# Patient Record
Sex: MALE | URBAN - METROPOLITAN AREA
[De-identification: names, ages, dates, MRNs, and addresses within clinical notes are randomized per-mention and may not be internally consistent; named-entity substitution may affect disease eponyms.]

---

## 2023-04-13 ENCOUNTER — APPOINTMENT (OUTPATIENT)
Dept: URBAN - METROPOLITAN AREA CLINIC 259 | Age: 73
Setting detail: DERMATOLOGY
End: 2023-04-13

## 2023-04-13 DIAGNOSIS — L82.1 OTHER SEBORRHEIC KERATOSIS: ICD-10-CM

## 2023-04-13 DIAGNOSIS — D22 MELANOCYTIC NEVI: ICD-10-CM

## 2023-04-13 DIAGNOSIS — D18.0 HEMANGIOMA: ICD-10-CM

## 2023-04-13 DIAGNOSIS — L81.4 OTHER MELANIN HYPERPIGMENTATION: ICD-10-CM

## 2023-04-13 DIAGNOSIS — L91.8 OTHER HYPERTROPHIC DISORDERS OF THE SKIN: ICD-10-CM

## 2023-04-13 DIAGNOSIS — L57.0 ACTINIC KERATOSIS: ICD-10-CM

## 2023-04-13 DIAGNOSIS — Z71.89 OTHER SPECIFIED COUNSELING: ICD-10-CM

## 2023-04-13 DIAGNOSIS — L57.8 OTHER SKIN CHANGES DUE TO CHRONIC EXPOSURE TO NONIONIZING RADIATION: ICD-10-CM

## 2023-04-13 PROBLEM — D18.01 HEMANGIOMA OF SKIN AND SUBCUTANEOUS TISSUE: Status: ACTIVE | Noted: 2023-04-13

## 2023-04-13 PROBLEM — D22.5 MELANOCYTIC NEVI OF TRUNK: Status: ACTIVE | Noted: 2023-04-13

## 2023-04-13 PROCEDURE — OTHER COUNSELING: OTHER

## 2023-04-13 PROCEDURE — 99203 OFFICE O/P NEW LOW 30 MIN: CPT | Mod: 25

## 2023-04-13 PROCEDURE — 17003 DESTRUCT PREMALG LES 2-14: CPT

## 2023-04-13 PROCEDURE — OTHER MIPS QUALITY: OTHER

## 2023-04-13 PROCEDURE — OTHER LIQUID NITROGEN: OTHER

## 2023-04-13 PROCEDURE — 17000 DESTRUCT PREMALG LESION: CPT

## 2023-04-13 ASSESSMENT — LOCATION SIMPLE DESCRIPTION DERM
LOCATION SIMPLE: RIGHT PRETIBIAL REGION
LOCATION SIMPLE: LEFT PRETIBIAL REGION
LOCATION SIMPLE: RIGHT UPPER BACK
LOCATION SIMPLE: POSTERIOR SCALP
LOCATION SIMPLE: LEFT UPPER BACK
LOCATION SIMPLE: LEFT AXILLARY VAULT

## 2023-04-13 ASSESSMENT — LOCATION DETAILED DESCRIPTION DERM
LOCATION DETAILED: LEFT PROXIMAL PRETIBIAL REGION
LOCATION DETAILED: POSTERIOR MID-PARIETAL SCALP
LOCATION DETAILED: LEFT AXILLARY VAULT
LOCATION DETAILED: LEFT MID-UPPER BACK
LOCATION DETAILED: RIGHT PROXIMAL PRETIBIAL REGION
LOCATION DETAILED: RIGHT SUPERIOR MEDIAL UPPER BACK
LOCATION DETAILED: LEFT MEDIAL UPPER BACK
LOCATION DETAILED: LEFT INFERIOR MEDIAL UPPER BACK

## 2023-04-13 ASSESSMENT — LOCATION ZONE DERM
LOCATION ZONE: SCALP
LOCATION ZONE: TRUNK
LOCATION ZONE: LEG
LOCATION ZONE: AXILLAE

## 2023-04-13 NOTE — PROCEDURE: LIQUID NITROGEN
Render Post-Care Instructions In Note?: no
Consent: The patient's consent was obtained including but not limited to risks of crusting, scabbing, blistering, scarring, darker or lighter pigmentary change, recurrence, incomplete removal and infection.
Post-Care Instructions: I reviewed with the patient in detail post-care instructions. Patient is to wear sunprotection, and avoid picking at any of the treated lesions. Pt may apply Vaseline to crusted or scabbing areas.
Duration Of Freeze Thaw-Cycle (Seconds): 0
Detail Level: Zone
Total Number Of Aks Treated: 5

## 2023-04-13 NOTE — PROCEDURE: MIPS QUALITY
Quality 431: Preventive Care And Screening: Unhealthy Alcohol Use - Screening: Patient not identified as an unhealthy alcohol user when screened for unhealthy alcohol use using a systematic screening method
Quality 226: Preventive Care And Screening: Tobacco Use: Screening And Cessation Intervention: Patient screened for tobacco use and is an ex/non-smoker
Quality 110: Preventive Care And Screening: Influenza Immunization: Influenza Immunization Ordered or Recommended, but not Administered due to system reason
Detail Level: Generalized
Quality 130: Documentation Of Current Medications In The Medical Record: Current Medications Documented

## 2023-05-05 ENCOUNTER — TRANSFERRED RECORDS (OUTPATIENT)
Dept: HEALTH INFORMATION MANAGEMENT | Facility: CLINIC | Age: 73
End: 2023-05-05

## 2023-06-09 ENCOUNTER — MEDICAL CORRESPONDENCE (OUTPATIENT)
Dept: HEALTH INFORMATION MANAGEMENT | Facility: CLINIC | Age: 73
End: 2023-06-09

## 2023-06-09 ENCOUNTER — TRANSFERRED RECORDS (OUTPATIENT)
Dept: HEALTH INFORMATION MANAGEMENT | Facility: CLINIC | Age: 73
End: 2023-06-09

## 2023-06-21 ENCOUNTER — REFERRAL (OUTPATIENT)
Dept: TRANSPLANT | Facility: CLINIC | Age: 73
End: 2023-06-21

## 2023-06-21 DIAGNOSIS — N18.4 CHRONIC KIDNEY DISEASE, STAGE IV (SEVERE) (H): ICD-10-CM

## 2023-06-21 DIAGNOSIS — N18.9 ANEMIA OF CHRONIC RENAL FAILURE: ICD-10-CM

## 2023-06-21 DIAGNOSIS — Z76.82 ORGAN TRANSPLANT CANDIDATE: ICD-10-CM

## 2023-06-21 DIAGNOSIS — I10 HYPERTENSION: ICD-10-CM

## 2023-06-21 DIAGNOSIS — N25.81 SECONDARY RENAL HYPERPARATHYROIDISM (H): ICD-10-CM

## 2023-06-21 DIAGNOSIS — D63.1 ANEMIA OF CHRONIC RENAL FAILURE: ICD-10-CM

## 2023-06-21 DIAGNOSIS — N18.4 CHRONIC KIDNEY DISEASE, STAGE 4, SEVERELY DECREASED GFR (H): Primary | ICD-10-CM

## 2023-06-21 DIAGNOSIS — E78.5 HYPERLIPIDEMIA: ICD-10-CM

## 2023-06-21 DIAGNOSIS — Z01.818 PRE-TRANSPLANT EVALUATION FOR KIDNEY TRANSPLANT: ICD-10-CM

## 2023-06-21 DIAGNOSIS — E11.9 DIABETES MELLITUS, TYPE 2 (H): ICD-10-CM

## 2023-06-21 NOTE — LETTER
Nael Silveira  [address] [facility] [address] [address]                August 18, 2023      Gabi Nayak,     I am sending this letter to review the pre-transplant process and education.     Please see your pre-kidney transplant evaluation on 08/21/203 starting at 7:30 AM. All your appointments will be at the:     [facility]  [address] [place] [id number]    For parking options, please park in the open lot across the street from the front door of our Clinic.  Otherwise, enter the Clinic and Surgery Center / arrival plaza from [facility] and attendants can assist you based on your needs.  parking is available for those with limited mobility M-F from 7:00 am to 5:00 pm.     Please bring your designated care person(s) to your appointment day to help listen to the patient education and ask questions that are important to you. You can eat/drink normally on this day. Please do not fast, as the appointment day will most likely go until 3 PM. There is a coffee shop on street level for you to purchase food and you can also bring food from home. Please be aware there are no microwaves or refrigerators for patient use at the clinic. Also, take all your prescribed medications as ordered on this day. There are no medication lab tests ordered during your appointments.    Upon completion of your appointments, I will compile the outcomes and have your results reviewed at the Transplant Team Selection Committee on Wednesday, 08/30/2023, of the following week. This is a medical review meeting only, you will not be asked to attend. I will call you within 10 days after this meeting to inform you of the outcomes and to assist in planning for the completion of your evaluation.     You have received an email today which contains a Receipt of Information consent and patient educational materials. You have already signed this consent today. Please read as much of these  educational materials as possible before your appointments on 08/21/2023.           Please complete your pre-kidney transplant education videos at these link: Pre-Kidney Transplant (I) (English): https://www.Litographsube.com/playlist?[email or web address]   Pre-Kidney Transplant (II) (English): https://www.Litographsube.com/playlist?[email or web address]     Additional transplant resources are as follows:   www.unos.org. UNOS, or United Network of Organ Sharing, is the national organization in our country that maintains all the organ wait lists and is responsible for the rules and regulations for organ allocation. I recommend looking at the Transplant Living section as this area has content created for patients.   www.srtr.org SRTR, or the Scientific Registry for Transplant Recipients is a national data base that all Transplant Centers report their success and failure rate for all organ types twice per year. The results are public knowledge and do provide a good perspective of organ transplant.     If the transplant providers tell you at your appointments that you should start to have live donors register with our Program to initiate their evaluations, please provide this registration website: [email or web address]   The donor will receive a detailed email response back with information and next steps specific to their situation. It is important that the donor responds to the email in order to  forward with their evaluation. Donors can also call our Office and ask to speak with a live donor coordinator in the event of questions at [phone].     Please let me know of any questions or concerns.     Sincerely,   Kaitlin Rust, RN, BSN  Pre Kidney Pancreas Transplant Coordinator   [facility]  Solid Organ Transplant Care   [address] [place] [address] [address]  [address] [address]  [email or web address]   Office Number:  [phone] Direct Number: [phone]   Fax Number: [phone]  Employed by [facility]     CC's: Dr. Jose E Ansari, Dr. Gagan Spear

## 2023-06-21 NOTE — LETTER
Nael Silveira  [address] [address]          Dear Nael,    Thank you for your interest in the Transplant Center at [facility]. We look forward to being a part of your care team and assisting you through the transplant process.    As we discussed, your transplant coordinator is Kaitlin Rust (Kidney).  You may call your coordinator at any time with questions or concerns.  Your first scheduled call will be on 7/11/2023, between 8am and 12pm.  If this needs to change, call [phone].    Please complete the following.    Fill out and return the enclosed forms  Authorization for Electronic Communication  Authorization to Discuss Protected Health Information  Authorization for Release of Protected Health Information    Sign up for:  afterBOTt, access to your electronic medical record (see enclosed pamphlet)  [employer]ord [place], a transplant education website    You can use these tools to learn more about your transplant, communicate with your care team, and track your medical details      Sincerely,      Solid Organ Transplant  [facility]    cc: Referring Physician

## 2023-06-27 ENCOUNTER — DOCUMENTATION ONLY (OUTPATIENT)
Dept: TRANSPLANT | Facility: CLINIC | Age: 73
End: 2023-06-27

## 2023-06-27 VITALS — BODY MASS INDEX: 35.55 KG/M2 | HEIGHT: 69 IN | WEIGHT: 240 LBS

## 2023-06-27 NOTE — TELEPHONE ENCOUNTER
PCP: Dr Gagan Spear MD  Referring Provider: Dr Jose E Ansari MD  Referring Diagnosis: CKD Stage 4    GFR/Date: 18 (4/28/2023)    Is patient under the age of 65? No  Is patient diabetic? Yes, per patient  Is patient on insulin? Yes, per patient  Was patient offered a pancreas transplant referral? No    Is patient in a group home/assisted living? No  Does patient have a guardian? No    Referral intake process completed.  Patient is aware that after financial approval is received, medical records will be requested.   Patient confirmed for a callback from transplant coordinator on 7/11/2023. (within 2 weeks)  Tentative evaluation date 8/21/2023, Slot 5 (within 4 weeks) if appointment is virtual, does patient have capabilities of setting this up? Patient request for in person appointment with transplant team.    Confirmed coordinator will discuss evaluation process in more detail at the time of their call.   Patient is aware of the need to arrange age appropriate cancer screening, vaccinations, and dental care.    Reminded patient to complete questionnaire, complete medical records release, and review packet prior to evaluation visit .    Assessed patient for special needs (ie-wheelchair, assistance, guardian, and ):  Yes  Patient wears glasses     Patient instructed to call [phone] with questions.     Patient gave verbal consent during intake call to obtain medical records and documents outside of [facility]:  Yes

## 2023-07-06 ENCOUNTER — TRANSFERRED RECORDS (OUTPATIENT)
Dept: HEALTH INFORMATION MANAGEMENT | Facility: CLINIC | Age: 73
End: 2023-07-06
Payer: MEDICARE

## 2023-07-11 ENCOUNTER — TELEPHONE (OUTPATIENT)
Dept: TRANSPLANT | Facility: CLINIC | Age: 73
End: 2023-07-11

## 2023-07-11 PROBLEM — E78.5 HYPERLIPIDEMIA: Status: ACTIVE | Noted: 2023-07-11

## 2023-07-11 PROBLEM — E11.9 DIABETES MELLITUS, TYPE 2 (H): Status: ACTIVE | Noted: 2023-07-11

## 2023-07-11 PROBLEM — I10 HYPERTENSION: Status: ACTIVE | Noted: 2023-07-11

## 2023-07-11 PROBLEM — N18.9 ANEMIA OF CHRONIC RENAL FAILURE: Status: ACTIVE | Noted: 2023-07-11

## 2023-07-11 PROBLEM — D63.1 ANEMIA OF CHRONIC RENAL FAILURE: Status: ACTIVE | Noted: 2023-07-11

## 2023-07-11 PROBLEM — N25.81 SECONDARY RENAL HYPERPARATHYROIDISM (H): Status: ACTIVE | Noted: 2023-07-11

## 2023-07-11 NOTE — TELEPHONE ENCOUNTER
Patient Call: General  Route to LPN    Reason for call: Tosin from [facility] called in regards patient's current appointment scheduled. They made a mistake with initail scheduling and need assistance fixing it. Call back number is [phone].    Call back needed? Yes    Return Call Needed  Same as documented in contacts section  When to return call?: Same day: Route High Priority

## 2023-07-11 NOTE — TELEPHONE ENCOUNTER
Writer called imaging back and rescheduled appointments.  No other action is needed.  Thanks!    Huma

## 2023-07-11 NOTE — TELEPHONE ENCOUNTER
Reviewed chart for purpose of pre kidney transplant evaluation planning. Per Dr. Jose E Ansari's note 05/05/2023, pt has CKD stage 4 secondary to diabetes mellitus. No kidney biopsy done.     Contacted patient and introduced myself as their Transplant Coordinator, also introduced the role of the Transplant Coordinator in the transplant process.  Explained the purpose of this call including reviewing next steps and answering questions.  Pt confirmed he wants to attend a pre kidney transplant evaluation.   Confirmed Referring Provider, Dialysis Center, and Primary Care Physician. Notified patient of the importance of continued communication with referring providers and primary care physicians.    Reviewed components of transplant evaluation process including necessary appointments, tests, and procedures.    Answered questions for patient regarding evaluation, provided my name and contact information and requested they call with any additional questions.    Determined that patient would like additional information regarding transplant by:     Drop Down choices: Mail, Email, MyChart, Phone Call   Encourage MyChart -  pt already has My Chart already.   Pt and wife confirmed they will attend STD PKE on 08/21/2023, slot # 5. Instructed pt to bring 1-2 people with them to eval and to eat and drink and normally on eval day. Instructed pt they will receive an email from Victorina in our Office prior to eval with a Receipt of Info and educational materials - instructed to read materials and sign consent prior to eval.  Instructed pt on use of My Transplant Place and to view pre-kidney eval parts 1 and 2, as well as, on the use of www.unos.org and www.srtr.org.  Pt and wife expressed excellent understanding of all and were in good agreement with the plan.    Smart set orders into EPIC for PKE 08/21/2023 slot # 5.   Pre transplant patient education letter/ email sent.

## 2023-07-16 ENCOUNTER — HEALTH MAINTENANCE LETTER (OUTPATIENT)
Age: 73
End: 2023-07-16

## 2023-08-18 NOTE — PROGRESS NOTES
[facility] Solid Organ Transplant  Outpatient MNT: Kidney Transplant Evaluation    Current BMI: 34.8 (HT 70 in,  lbs/110 kg)  BMI guideline for kidney transplant up to a BMI of 40 / per surgeon discretion     Frailty Assessment -- Not Frail (1/5 points)- low activity level       Time Spent: 30 minutes  Visit Type: Initial   Referring Physician: Emiliana   Pt accompanied by: his wife, Sharyn     History of previous txp: none   Dialysis: no     Nutrition Assessment  H/o DM II. Checks BG several times/day. Meet with diabetes provider who recommends 60 g cho/day. Wife reports often falling short of this at meals and having to find additional food just to give his insulin.     - Appetite: good/baseline   - Food allergies/intolerances: none   - Meal prep & grocery shopping: pt / wife   - Issues chewing or swallowing: none   - N/V/D/C: none   - Food access concerns: not asked     Vitamins, Supplements, Pertinent Meds: vit D  Herbal Medicines/Supplements: none     Edema: mild vs none     Weight hx: has gained 10 lbs, which pt somewhat correlates w/ having to eat more to give his insulin, being more sedentary over the years      Diet Recall  Breakfast Eggs, cereal, fruit, toast, oatmeal/cream of wheat    Lunch 1 slice bread with 2 slices cheese (grilled cheese) + can of Healthy Request tomato soup, plum, 3 baby carrots with Green Goddess; ham and cheese s/w (1/2) on hoagie bun + potato chips    Dinner Chicken/beef + veggies/salad; will often add baked potato to meet cho requirement    Snacks Chips   Beverages Water, coffee, 4 oz almond milk    Alcohol 6 drinks/week (cocktail or beer)   Dining out 1-2x/week     Physical Activity  No routine activity   - stopped working in 2016   Slightly more fatigued but still able to complete ADLs    Labs  A1c 6.2 (June 2023)  8/18 Phos 3.2 K 4.2     Nutrition Diagnosis  No nutrition diagnosis identified at this time     Nutrition Intervention  Nutrition education  provided:  Discussed sodium intake (low sodium foods and drinks, seasoning food without salt and tips for low sodium diet).  Reviewed wnl K/Phos levels, which do not warrant dietary modification at this time.   Reviewed weight gain in part d/t eating more to take insulin + less active lifestyle. Discussed benefits of a lower carb diet, including likely a lower need for insulin/taking less insulin, weight loss, etc.     Reviewed post txp diet guidelines in brief (will review in further detail post txp):  (1) Review of proper food safety measures d/t immunosuppressant therapy post-op and increased risk for food-borne illness    (2) Avoid the following post txp d/t risk for rejection, unknown effects on the organs, and/or potential interactions with immunosuppressants:  - Herbal, Chinese, holistic, chiropractic, natural, alternative medicines and supplements  - Detoxes and cleanses  - Weight loss pills  - Protein powders or other products with extracts or herbs (ie green tea extract)    (3) Med regimen and possible side effects    Patient Understanding: Pt verbalized understanding of education provided.  Expected Engagement: Good  Follow-Up Plans: PRN     Nutrition Goals  No nutrition goals identified at this time     Betsy Chacon, RD, LD, CCTD

## 2023-08-20 LAB
ABO/RH(D): NORMAL
ANTIBODY SCREEN: NEGATIVE
SPECIMEN EXPIRATION DATE: NORMAL

## 2023-08-21 ENCOUNTER — DOCUMENTATION ONLY (OUTPATIENT)
Dept: TRANSPLANT | Facility: CLINIC | Age: 73
End: 2023-08-21

## 2023-08-21 ENCOUNTER — APPOINTMENT (OUTPATIENT)
Dept: TRANSPLANT | Facility: CLINIC | Age: 73
End: 2023-08-21
Attending: PHYSICIAN ASSISTANT
Payer: MEDICARE

## 2023-08-21 ENCOUNTER — ANCILLARY PROCEDURE (OUTPATIENT)
Dept: CARDIOLOGY | Facility: CLINIC | Age: 73
End: 2023-08-21
Attending: PHYSICIAN ASSISTANT
Payer: MEDICARE

## 2023-08-21 ENCOUNTER — ANCILLARY PROCEDURE (OUTPATIENT)
Dept: GENERAL RADIOLOGY | Facility: CLINIC | Age: 73
End: 2023-08-21
Attending: PHYSICIAN ASSISTANT
Payer: MEDICARE

## 2023-08-21 ENCOUNTER — LAB (OUTPATIENT)
Dept: LAB | Facility: CLINIC | Age: 73
End: 2023-08-21
Attending: PHYSICIAN ASSISTANT
Payer: MEDICARE

## 2023-08-21 VITALS
OXYGEN SATURATION: 98 % | HEART RATE: 65 BPM | SYSTOLIC BLOOD PRESSURE: 162 MMHG | HEIGHT: 70 IN | BODY MASS INDEX: 34.79 KG/M2 | WEIGHT: 243 LBS | DIASTOLIC BLOOD PRESSURE: 68 MMHG

## 2023-08-21 DIAGNOSIS — I10 HYPERTENSION: ICD-10-CM

## 2023-08-21 DIAGNOSIS — E78.5 HYPERLIPIDEMIA: ICD-10-CM

## 2023-08-21 DIAGNOSIS — Z76.82 ORGAN TRANSPLANT CANDIDATE: ICD-10-CM

## 2023-08-21 DIAGNOSIS — D63.1 ANEMIA OF CHRONIC RENAL FAILURE: ICD-10-CM

## 2023-08-21 DIAGNOSIS — N25.81 SECONDARY RENAL HYPERPARATHYROIDISM (H): ICD-10-CM

## 2023-08-21 DIAGNOSIS — Z01.818 PRE-TRANSPLANT EVALUATION FOR KIDNEY TRANSPLANT: ICD-10-CM

## 2023-08-21 DIAGNOSIS — N18.4 CHRONIC KIDNEY DISEASE, STAGE 4, SEVERELY DECREASED GFR (H): ICD-10-CM

## 2023-08-21 DIAGNOSIS — N18.9 ANEMIA OF CHRONIC RENAL FAILURE: ICD-10-CM

## 2023-08-21 DIAGNOSIS — N18.4 CHRONIC KIDNEY DISEASE, STAGE IV (SEVERE) (H): ICD-10-CM

## 2023-08-21 DIAGNOSIS — Z79.4 TYPE 2 DIABETES MELLITUS WITH STAGE 4 CHRONIC KIDNEY DISEASE, WITH LONG-TERM CURRENT USE OF INSULIN (H): ICD-10-CM

## 2023-08-21 DIAGNOSIS — E11.9 DIABETES MELLITUS, TYPE 2 (H): ICD-10-CM

## 2023-08-21 DIAGNOSIS — N18.4 CHRONIC KIDNEY DISEASE, STAGE 4 (SEVERE) (H): ICD-10-CM

## 2023-08-21 DIAGNOSIS — Z12.5 ENCOUNTER FOR SCREENING FOR MALIGNANT NEOPLASM OF PROSTATE: ICD-10-CM

## 2023-08-21 DIAGNOSIS — N18.4 ANEMIA OF CHRONIC RENAL FAILURE, STAGE 4 (SEVERE) (H): ICD-10-CM

## 2023-08-21 DIAGNOSIS — R79.9 ABNORMAL FINDING OF BLOOD CHEMISTRY, UNSPECIFIED: ICD-10-CM

## 2023-08-21 DIAGNOSIS — N18.4 TYPE 2 DIABETES MELLITUS WITH STAGE 4 CHRONIC KIDNEY DISEASE, WITH LONG-TERM CURRENT USE OF INSULIN (H): ICD-10-CM

## 2023-08-21 DIAGNOSIS — E78.5 HYPERLIPIDEMIA, UNSPECIFIED HYPERLIPIDEMIA TYPE: ICD-10-CM

## 2023-08-21 DIAGNOSIS — Z11.59 ENCOUNTER FOR SCREENING FOR OTHER VIRAL DISEASES: ICD-10-CM

## 2023-08-21 DIAGNOSIS — E11.22 TYPE 2 DIABETES MELLITUS WITH STAGE 4 CHRONIC KIDNEY DISEASE, WITH LONG-TERM CURRENT USE OF INSULIN (H): ICD-10-CM

## 2023-08-21 DIAGNOSIS — I10 PRIMARY HYPERTENSION: ICD-10-CM

## 2023-08-21 DIAGNOSIS — D63.1 ANEMIA OF CHRONIC RENAL FAILURE, STAGE 4 (SEVERE) (H): ICD-10-CM

## 2023-08-21 LAB
A1 AB TITR SERPL: 128 {TITER}
A1 AB TITR SERPL: 8 {TITER}
ABO/RH(D): NORMAL
ALBUMIN MFR UR ELPH: 93.9 MG/DL
ALBUMIN SERPL BCG-MCNC: 4.2 G/DL (ref 3.5–5.2)
ALBUMIN UR-MCNC: 100 MG/DL
ALP SERPL-CCNC: 75 U/L (ref 40–129)
ALT SERPL W P-5'-P-CCNC: 21 U/L (ref 0–70)
ANION GAP SERPL CALCULATED.3IONS-SCNC: 10 MMOL/L (ref 7–15)
ANTIBODY TITER IGM SCREEN: NEGATIVE
APPEARANCE UR: CLEAR
AST SERPL W P-5'-P-CCNC: 21 U/L (ref 0–45)
B IGG TITR SERPL: 8 {TITER}
B IGM TITR SERPL: 8 {TITER}
BILIRUB SERPL-MCNC: 0.4 MG/DL
BILIRUB UR QL STRIP: NEGATIVE
BUN SERPL-MCNC: 52 MG/DL (ref 8–23)
CALCIUM SERPL-MCNC: 9.1 MG/DL (ref 8.8–10.2)
CHLORIDE SERPL-SCNC: 107 MMOL/L (ref 98–107)
COLOR UR AUTO: ABNORMAL
CREAT SERPL-MCNC: 3.34 MG/DL (ref 0.67–1.17)
CREAT UR-MCNC: 128 MG/DL
DEPRECATED HCO3 PLAS-SCNC: 25 MMOL/L (ref 22–29)
ERYTHROCYTE [DISTWIDTH] IN BLOOD BY AUTOMATED COUNT: 12.4 % (ref 10–15)
FACTOR 2 INTERPRETATION: NORMAL
FACTOR V INTERPRETATION: NORMAL
GFR SERPL CREATININE-BSD FRML MDRD: 19 ML/MIN/1.73M2
GLUCOSE SERPL-MCNC: 254 MG/DL (ref 70–99)
GLUCOSE UR STRIP-MCNC: 300 MG/DL
HBA1C MFR BLD: 6.4 %
HBV CORE AB SERPL QL IA: NONREACTIVE
HBV SURFACE AB SERPL IA-ACNC: 0.39 M[IU]/ML
HBV SURFACE AB SERPL IA-ACNC: NONREACTIVE M[IU]/ML
HBV SURFACE AG SERPL QL IA: NONREACTIVE
HCT VFR BLD AUTO: 31.9 % (ref 40–53)
HCV AB SERPL QL IA: NONREACTIVE
HGB BLD-MCNC: 11 G/DL (ref 13.3–17.7)
HGB UR QL STRIP: NEGATIVE
HIV 1+2 AB+HIV1 P24 AG SERPL QL IA: NONREACTIVE
HYALINE CASTS: 1 /LPF
INR PPP: 0.99 (ref 0.85–1.15)
KETONES UR STRIP-MCNC: NEGATIVE MG/DL
LAB DIRECTOR COMMENTS: NORMAL
LAB DIRECTOR DISCLAIMER: NORMAL
LAB DIRECTOR INTERPRETATION: NORMAL
LAB DIRECTOR METHODOLOGY: NORMAL
LAB DIRECTOR RESULTS: NORMAL
LEUKOCYTE ESTERASE UR QL STRIP: NEGATIVE
LVEF ECHO: NORMAL
MCH RBC QN AUTO: 30.5 PG (ref 26.5–33)
MCHC RBC AUTO-ENTMCNC: 34.5 G/DL (ref 31.5–36.5)
MCV RBC AUTO: 88 FL (ref 78–100)
NITRATE UR QL: NEGATIVE
PH UR STRIP: 6.5 [PH] (ref 5–7)
PLATELET # BLD AUTO: 131 10E3/UL (ref 150–450)
POTASSIUM SERPL-SCNC: 3.9 MMOL/L (ref 3.4–5.3)
PROT SERPL-MCNC: 6.4 G/DL (ref 6.4–8.3)
PROT/CREAT 24H UR: 0.73 MG/MG CR (ref 0–0.2)
PSA SERPL DL<=0.01 NG/ML-MCNC: 0.74 NG/ML (ref 0–6.5)
PTH-INTACT SERPL-MCNC: 197 PG/ML (ref 15–65)
RBC # BLD AUTO: 3.61 10E6/UL (ref 4.4–5.9)
RBC URINE: 1 /HPF
SODIUM SERPL-SCNC: 142 MMOL/L (ref 136–145)
SP GR UR STRIP: 1.02 (ref 1–1.03)
SPECIMEN DESCRIPTION: NORMAL
SPECIMEN EXPIRATION DATE: NORMAL
SPECIMEN EXPIRATION DATE: NORMAL
SQUAMOUS EPITHELIAL: 1 /HPF
URATE SERPL-MCNC: 8.3 MG/DL (ref 3.4–7)
UROBILINOGEN UR STRIP-MCNC: NORMAL MG/DL
WBC # BLD AUTO: 4.3 10E3/UL (ref 4–11)
WBC URINE: <1 /HPF

## 2023-08-21 PROCEDURE — 85610 PROTHROMBIN TIME: CPT | Performed by: PATHOLOGY

## 2023-08-21 PROCEDURE — 71046 X-RAY EXAM CHEST 2 VIEWS: CPT | Performed by: RADIOLOGY

## 2023-08-21 PROCEDURE — 86706 HEP B SURFACE ANTIBODY: CPT | Performed by: INTERNAL MEDICINE

## 2023-08-21 PROCEDURE — 93306 TTE W/DOPPLER COMPLETE: CPT | Mod: GC | Performed by: STUDENT IN AN ORGANIZED HEALTH CARE EDUCATION/TRAINING PROGRAM

## 2023-08-21 PROCEDURE — 99205 OFFICE O/P NEW HI 60 MIN: CPT

## 2023-08-21 PROCEDURE — 86665 EPSTEIN-BARR CAPSID VCA: CPT | Performed by: INTERNAL MEDICINE

## 2023-08-21 PROCEDURE — 84681 ASSAY OF C-PEPTIDE: CPT | Performed by: INTERNAL MEDICINE

## 2023-08-21 PROCEDURE — 86644 CMV ANTIBODY: CPT | Performed by: INTERNAL MEDICINE

## 2023-08-21 PROCEDURE — 86147 CARDIOLIPIN ANTIBODY EA IG: CPT | Performed by: INTERNAL MEDICINE

## 2023-08-21 PROCEDURE — 85390 FIBRINOLYSINS SCREEN I&R: CPT | Mod: 26 | Performed by: PATHOLOGY

## 2023-08-21 PROCEDURE — 86901 BLOOD TYPING SEROLOGIC RH(D): CPT | Performed by: INTERNAL MEDICINE

## 2023-08-21 PROCEDURE — 80053 COMPREHEN METABOLIC PANEL: CPT | Performed by: PATHOLOGY

## 2023-08-21 PROCEDURE — 85027 COMPLETE CBC AUTOMATED: CPT | Performed by: PATHOLOGY

## 2023-08-21 PROCEDURE — 84550 ASSAY OF BLOOD/URIC ACID: CPT | Performed by: PATHOLOGY

## 2023-08-21 PROCEDURE — 84156 ASSAY OF PROTEIN URINE: CPT | Performed by: PATHOLOGY

## 2023-08-21 PROCEDURE — 86704 HEP B CORE ANTIBODY TOTAL: CPT | Performed by: INTERNAL MEDICINE

## 2023-08-21 PROCEDURE — G0452 MOLECULAR PATHOLOGY INTERPR: HCPCS | Mod: 26 | Performed by: STUDENT IN AN ORGANIZED HEALTH CARE EDUCATION/TRAINING PROGRAM

## 2023-08-21 PROCEDURE — 93000 ELECTROCARDIOGRAM COMPLETE: CPT | Performed by: INTERNAL MEDICINE

## 2023-08-21 PROCEDURE — G0103 PSA SCREENING: HCPCS | Performed by: PATHOLOGY

## 2023-08-21 PROCEDURE — 81001 URINALYSIS AUTO W/SCOPE: CPT | Performed by: PATHOLOGY

## 2023-08-21 PROCEDURE — 36415 COLL VENOUS BLD VENIPUNCTURE: CPT | Performed by: PATHOLOGY

## 2023-08-21 PROCEDURE — 99000 SPECIMEN HANDLING OFFICE-LAB: CPT | Performed by: PATHOLOGY

## 2023-08-21 PROCEDURE — 83970 ASSAY OF PARATHORMONE: CPT | Performed by: PATHOLOGY

## 2023-08-21 PROCEDURE — 99205 OFFICE O/P NEW HI 60 MIN: CPT | Performed by: SURGERY

## 2023-08-21 PROCEDURE — 86832 HLA CLASS I HIGH DEFIN QUAL: CPT | Performed by: INTERNAL MEDICINE

## 2023-08-21 PROCEDURE — 86886 COOMBS TEST INDIRECT TITER: CPT | Performed by: INTERNAL MEDICINE

## 2023-08-21 PROCEDURE — 83036 HEMOGLOBIN GLYCOSYLATED A1C: CPT | Performed by: INTERNAL MEDICINE

## 2023-08-21 PROCEDURE — 81240 F2 GENE: CPT | Performed by: INTERNAL MEDICINE

## 2023-08-21 PROCEDURE — 86803 HEPATITIS C AB TEST: CPT | Performed by: INTERNAL MEDICINE

## 2023-08-21 PROCEDURE — G0463 HOSPITAL OUTPT CLINIC VISIT: HCPCS

## 2023-08-21 PROCEDURE — 81378 HLA I & II TYPING HR: CPT | Performed by: INTERNAL MEDICINE

## 2023-08-21 PROCEDURE — 86850 RBC ANTIBODY SCREEN: CPT | Performed by: INTERNAL MEDICINE

## 2023-08-21 PROCEDURE — 85730 THROMBOPLASTIN TIME PARTIAL: CPT | Performed by: INTERNAL MEDICINE

## 2023-08-21 PROCEDURE — 87340 HEPATITIS B SURFACE AG IA: CPT | Performed by: INTERNAL MEDICINE

## 2023-08-21 PROCEDURE — 86481 TB AG RESPONSE T-CELL SUSP: CPT | Performed by: INTERNAL MEDICINE

## 2023-08-21 PROCEDURE — 86787 VARICELLA-ZOSTER ANTIBODY: CPT | Performed by: PHYSICIAN ASSISTANT

## 2023-08-21 PROCEDURE — 86833 HLA CLASS II HIGH DEFIN QUAL: CPT | Performed by: INTERNAL MEDICINE

## 2023-08-21 RX ORDER — HYDROCHLOROTHIAZIDE 12.5 MG/1
TABLET ORAL
COMMUNITY

## 2023-08-21 RX ORDER — ATORVASTATIN CALCIUM 20 MG/1
TABLET, FILM COATED ORAL
COMMUNITY
Start: 2022-11-22

## 2023-08-21 RX ORDER — ISOSORBIDE MONONITRATE 60 MG/1
TABLET, EXTENDED RELEASE ORAL
COMMUNITY
Start: 2023-01-20

## 2023-08-21 RX ORDER — INSULIN ASPART 100 [IU]/ML
INJECTION, SOLUTION INTRAVENOUS; SUBCUTANEOUS
COMMUNITY

## 2023-08-21 RX ORDER — DULAGLUTIDE 1.5 MG/.5ML
INJECTION, SOLUTION SUBCUTANEOUS
COMMUNITY

## 2023-08-21 RX ORDER — INSULIN GLARGINE 100 [IU]/ML
INJECTION, SOLUTION SUBCUTANEOUS
COMMUNITY
Start: 2021-10-15

## 2023-08-21 RX ORDER — PEN NEEDLE, DIABETIC 32GX 5/32"
NEEDLE, DISPOSABLE MISCELLANEOUS
COMMUNITY
Start: 2023-02-10

## 2023-08-21 RX ORDER — HYDRALAZINE HYDROCHLORIDE 100 MG/1
100 TABLET, FILM COATED ORAL
COMMUNITY
Start: 2022-04-18

## 2023-08-21 RX ORDER — AMLODIPINE BESYLATE 10 MG/1
TABLET ORAL
COMMUNITY

## 2023-08-21 RX ORDER — LOSARTAN POTASSIUM 25 MG/1
TABLET ORAL
COMMUNITY
Start: 2023-03-02

## 2023-08-21 RX ORDER — CALCITRIOL 0.25 UG/1
1 CAPSULE, LIQUID FILLED ORAL
COMMUNITY
Start: 2023-05-05

## 2023-08-21 RX ORDER — VITAMIN B COMPLEX
TABLET ORAL
COMMUNITY

## 2023-08-21 RX ORDER — TAMSULOSIN HYDROCHLORIDE 0.4 MG/1
1 CAPSULE ORAL DAILY
COMMUNITY
Start: 2022-12-14

## 2023-08-21 NOTE — LETTER
8/21/2023         RE: Nael Silveira  [address] [address]        Dear Colleague,    Thank you for referring your patient, Nael Silveira, to the [facility]. Please see a copy of my visit note below.    Transplant Surgery Consult Note     Medical record number: [id number]  YOB: 1950,   Consult requested by Dr Ansari for evaluation of kidney transplant candidacy.    Assessment and Recommendations:Mr. Silveira appears to be a good candidate for kidney transplantation and has a good understanding of the risks and benefits of this approach to the management of renal failure. The following issues should be addressed prior to finalizing his transplant candidacy:   72 yo male with type 2 DM on 20-30 units/d  CKD 4 with GFR 18  No blood transfusions  ABd obese - recommend weight loss of 10 lbs  Blood type UNK    No  live donors at this time  But will look actively as that might be his only option if the wait time is as expected    Risks of the surgical procedure including but not limited to the rare risk of mortality discussed in detail. Patient verbalized good understanding and had several pertinent questions which were answered satisfactorily.     Immunosuppressive regimen, management and long term risks discussed in detail.       Transplant order: Mr. Silveira has Type 2 Diabetes whose condition is not expected to resolve, is expected to progress, and is expected to continue to develop related comorbid conditions.  Recommend he be considered as a candidate for kidney.  Cardiology consult for cardiac risk stratification to be ordered: Yes  CT abdomen and pelvis without contrast to be ordered for assessment of vascular targets: Yes  Transplant listing labs ordered to include HLA, ABOx2, Cr, etc.  Dietician consult ordered: Yes  Social work consult ordered: Yes  Imaging reports reviewed:  yes  Radiology images reviewed:no  Recipient suitable to move  forward with work up of living donors:  Yes      The majority of our visit was spent in counselling, discussing the medical and surgical risks of kidney transplantation. We discussed approximate wait time and how that is influenced by issues such as blood type and sensitization (PRA) and access to a living donor. I contrasted potential waiting time for living vs  donor kidneys from  normal (0-85%) or higher (%) kidney donor profile index (KDPI) donors and their associated outcomes. I would not recommend this individual to consider kidneys from high KDPI donors. The reason for this decision is best summarized as:  patient preference . Potential surgical complications of kidney transplantation include bleeding, superficial or deep wound complications (infection, hernia, lymphocele), ureteral anastomotic failure (leak or stenosis), graft thrombosis, need for reoperation and other issues such as cardiac complications, pneumonia, deep venous thrombosis, pulmonary embolism, post transplant diabetes and death. The potential for recurrent disease or need for retransplantation was also addressed. We discussed the possible need for ureteral stent (and subsequent removal), and the utility of protocol biopsy and laboratory studies to evaluate for rejection or recurrent disease. We discussed the risk of graft rejection, our center's average graft and patient survival rates, immunosuppression protocols, as well as the potential opportunity to participate in clinical trials.  We also discussed the average length of stay, recovery process, and posttransplant lab and monitoring protocol.  I emphasized the need for strict immunosuppression medication adherence and the potential for complications of immunosuppression such as skin cancer or lymphoma, as well as a very low but not zero risk of donor-derived disease transmission risks (infection, cancer). Shruthi Wrightcurry asked good questions and his candidacy will be  reviewed at our Multidisciplinary Selection Committee. Thank you for the opportunity to participate in Mr. Silveira's care.      Total time: 60 minutes  Counselling time: 30 minutes    .  Marlen Fonseca in Immunology and Transplantation  Surgical Director, Kidney & Pancreas Transplant Programs  Medical Director, Solid Organ Transplant Unit    ---------------------------------------------------------------------------------------------------    HPI: Mr. Silveira has Type 2 Diabetes. The patient is non-diabetic.       The patient is not on dialysis.    Has potential kidney donors:  Doesn't know .  Interested in participation in paired exchange if a donor is willing: Doesn't know     The patient has the following pertinent history:       No    Yes  Dialysis:    []      [] via:       Blood Transfusion                  []      []  Number of units:   Most recently:  Pregnancy:    []      [] Number:       Previous Transplant:  []      [] Details:    Cancer    []      [] Comment:   Kidney stones   []      [] Comment:      Recurrent infections  []      []  Type:                  Bladder dysfunction  []      [] Cause:    Claudication   []      [] Distance:    Previous Amputation  []      [] Cause:     Chronic anticoagulation  []      [] Indication:   Sikh  []      []      Past Medical History:   Diagnosis Date    Anemia of chronic renal failure     Diabetes mellitus, type 2 (H)     diagnosed at age 46 years old, started insulin at about age 64    Hyperlipidemia     Hypertension     Secondary renal hyperparathyroidism (H)      Past Surgical History:   Procedure Laterality Date    CATARACT EXTRACTION Left     CATARACT EXTRACTION Right     RELEASE CARPAL TUNNEL       No family history on file.  Social History     Socioeconomic History    Marital status:      Spouse name: Not on file    Number of children: Not on file    Years of education: Not on file    Highest education level: Not on  file   Occupational History    Not on file   Tobacco Use    Smoking status: Former     Packs/day: 1.00     Types: Cigarettes     Start date:      Quit date:      Years since quittin.6    Smokeless tobacco: Never   Substance and Sexual Activity    Alcohol use: Yes     Comment: Socially    Drug use: Never    Sexual activity: Not on file   Other Topics Concern    Not on file   Social History Narrative    Not on file     Social Determinants of Health     Financial Resource Strain: Not on file   Food Insecurity: Not on file   Transportation Needs: Not on file   Physical Activity: Not on file   Stress: Not on file   Social Connections: Not on file   Intimate Partner Violence: Not on file   Housing Stability: Not on file       ROS:   CONSTITUTIONAL:  No fevers or chills  EYES: negative for icterus  ENT:  negative for hearing loss, tinnitus and sore throat  RESPIRATORY:  negative for cough, sputum, dyspnea  CARDIOVASCULAR:  negative for chest pain Fatigue  GASTROINTESTINAL:  negative for nausea, vomiting, diarrhea or constipation  GENITOURINARY:  negative for incontinence, dysuria, bladder emptying problems  HEME:  No easy bruising  INTEGUMENT:  negative for rash and pruritus  NEURO:  Negative for headache, seizure disorder  Allergies:   No Known Allergies  Medications:  Prescription Medications as of 2023         Rx Number Disp Refills Start End Last Dispensed Date Next Fill Date Owning Pharmacy    amLODIPine (NORVASC) 10 MG tablet            Sig: TAKE 1 TABLET ONCE DAILY for 90    Class: Historical    atorvastatin (LIPITOR) 20 MG tablet    2022        Sig: TAKE 1 TABLET ONCE DAILY for 90    Class: Historical    BD PEN NEEDLE COLTON 2ND GEN 32G X 4 MM miscellaneous    2/10/2023        Class: Historical    calcitRIOL (ROCALTROL) 0.25 MCG capsule    2023        Si capsule    Class: Historical    FLUoxetine (PROZAC) 20 MG capsule    2021        Sig: TAKE 1 CAPSULE ONCE DAILY for 90     Class: Historical    hydrALAZINE (APRESOLINE) 100 MG tablet    2022        Sig: Take 100 mg by mouth    Class: Historical    Route: Oral    hydrochlorothiazide (HYDRODIURIL) 12.5 MG tablet            Sig: TAKE 1 TABLET ONCE DAILY for 90    Class: Historical    insulin glargine (BASAGLAR KWIKPEN) 100 UNIT/ML pen    10/15/2021        Si units Subcutaneous before bed    Class: Historical    isosorbide mononitrate (IMDUR) 60 MG 24 hr tablet    2023        Sig: TAKE 1 TABLET ONCE DAILY for 90    Class: Historical    losartan (COZAAR) 25 MG tablet    3/2/2023        Si.5 tablet Orally once a day    Class: Historical    NOVOLOG FLEXPEN 100 UNIT/ML soln            Sig: Take 8 units - 3x/day    Class: Historical    study - aspirin, IDS# [id number], 81 mg tablet            Sig: Take 81 mg by mouth    Class: Historical    Route: Oral    tamsulosin (FLOMAX) 0.4 MG capsule    2022        Sig: Take 1 capsule by mouth daily    Class: Historical    Route: Oral    TRULICITY 1.5 MG/0.5ML pen            Sig: Take 1.5mg injection - 1x/week for 84    Class: Historical    Vitamin D3 (CHOLECALCIFEROL) 25 mcg (1000 units) tablet            Si tablet Orally Once a day for 30 day(s)    Class: Historical          Exam:     There were no vitals taken for this visit.  Appearance: in no apparent distress.   Skin: normal  Eyes:  no redness or discharge.  Sclera anicteric  Head and Neck: Normal, no rashes or jaundice  Respiratory: easy respirations, no audible wheezing.  Abdomen: protuberant, No surgical scars     Psychiatric: Normal mood and affect    Diagnostics:   No results found for this or any previous visit (from the past 672 hour(s)).  No results found for: CPRA       Again, thank you for allowing me to participate in the care of your patient.        Sincerely,        Shoshana Hopson MD

## 2023-08-21 NOTE — PROGRESS NOTES
Psychosocial Assessment  Patient Name/ Age: Nael Silveira 73 year old   Medical Record #: [id number]  Duration of Interview:     45  min  Process:   Face-to-Face Interview                (counseling < 50%)   Present at Appointment: Sebastian and his wife Sharyn        :SERA Denise, [facility] Date:  August 21, 2023        Type of transplant: Kidney    Donor type:   Sebastian indicated he does not know of any donors at this time.   Cadaver   Prior Transplants:    No Status of Transplant:       Current Living Situation    Location:   [address] [place] [phone]  With Whom:  Sharyn       Family/ Social Support:    Sebastian and Sharyn have two adult children Sergio (47) lives in [place], [place] and Matilda (44).  They have two grandchildren.  Sebastian has one brother ([place]) and one sister ([place]).   Available, helpful   Committed relationship:  Sebastian and Sharyn have been  for 51 years.   Stable/supportive   Other supports:   Friends Available, helpful       Activities/ Functional Ability    Current level:  Sebastian wears corrective lenses. Ambulatory, visually impaired, and independent with ADL's     Transportation Drives self       Vocational/Employment/Financial     Employment   Retired   Job Description      Income  Sharyn is also retired.   [employer]   Insurance  Medicare, BCBS Medicare Supplement Plan and Part D [employer]    At this time, patient can afford medication costs:  Yes  Medicare       Medical Status    Current mode of treatment for ESRD None   Complications - Diabetes controlled with insulin. None       Behavioral    Tobacco Use No Chemical Dependency No    Sebastian indicated he has six cocktails a week.     Psychiatric Impairment No  Sebastian indicated he is on medication for depression but is unsure if it is working as well as it should.  Discussed being close to dialysis and depression.  Encouraged Sebastian to discuss with his physician. He indicated understanding.    Reading ability  "Good  Education Level: High School Recent Legal History No                Coping Style/Strategies: Sebastian indicated when he is under stress he will \"get crabby\".     Ability to Adhere to Complex Medical Regime: Yes     Adherence History:  Sebastian indicated he will usually follow his physician's recommendations.        Education  _X_ Medicare  _X_ Rehabilitation  _X_ Donor issues  _X_ Community resources  _X_ Post discharge housing  _X_ Financial resources  _X_ Medical insurance options  _X_ Psych adjustment  _X_ Family adjustment  _X_ Health Care Directive - Provided Education and Declined Completing at this time.  Sebastian indicated he is in agreement with his wife making his medical decisions for him if he is unable. Psychosocial Risks of Transplant Reviewed and Discussed:  _X_ Increased stress related to emotional,            family, social, employment or financial           situation  _X_ Affect on work and/or disability benefits  _X_ Affect on future life insurance  _X_ Transplant outcome expectations may           not be met  _X_ Mental Health Risks: anxiety,           depression, PTSD, guilt, grief and           chronic fatigue     Notable Items:   None noted.       Final Evaluation/Assessment   Patient seemed to process information well. Appeared well informed, motivated and able to follow post transplant requirements. Behavior was appropriate during interview. Has adequate income and insurance coverage. Adequate social support. No major contraindications noted for transplant.  At this time patient appears to understand the risks and benefits of transplant.      Recommendation  Acceptable    Selection Criteria Met:  Plan for support Yes   Chemical Dependence Yes   Smoking Yes   Mental Health Yes   Adequate Finances Yes    Signature: SERA Denise, [facility]   Title: Clinical      "

## 2023-08-21 NOTE — PROGRESS NOTES
{PROVIDER CHARTING PREFERENCE:130251}    Subjective   Nael is a 73 year old, presenting for the following health issues:  Transplant Evaluation    HPI     ***      Review of Systems   {ROS COMP (Optional):896679}      Objective    There were no vitals taken for this visit.  There is no height or weight on file to calculate BMI.  Physical Exam   {Exam List (Optional):185745}    {Diagnostic Test Results (Optional):492271}    {AMBULATORY ATTESTATION (Optional):208379}            Psychosocial Assessment  Patient Name/ Age: Nael Silveira 73 year old   Medical Record #: [id number]  Duration of Interview:     ***min  Process:   Face-to-Face Interview                (counseling < 50%)   Present at Appointment: ***        :SERA Denise, [facility] Date:  August 24, 2023        Type of transplant: ***    Donor type:      {P TRANSPLANT  DONOR TYPE:185555671}   Prior Transplants:    {YES:541753} Status of Transplant:       Current Living Situation    Location:   [address] [place] [phone]  With Whom: { tx lives with:629980}       Family/ Social Support:     { tx support:686056}   Committed relationship:     { tx relationship:800314}   Other supports:    { tx support:597819}       Activities/ Functional Ability    Current level: {UMP TRANSPLANT  CURRENT LEVEL:220430801}     Transportation {P TRANSPLANT  DRIVE:801018795}       Vocational/Employment/Financial     Employment   {P TRANSPLANT  EMPLOYMENT:934371054}   Job Description      Income   { tx income:841736}   Insurance      At this time, patient can afford medication costs:  {YES:585823} { TRANSPLANT INSURANCES:966039}       Medical Status    Current mode of treatment for ESRD { TRANSPLANT MODE OF ESRD:749836}   Complications { TRANSPLANT COMPLICATIONS:749420}       Behavioral    Tobacco Use {YES:549325} Chemical Dependency {YES:129728}         Psychiatric Impairment {YES:730377}      Reading ability  {GOOD/POOR:313029}  Education Level: { TRANSPLANT EDUCATION LEVEL:162196} Recent Legal History {YES:092332}      Coping Style/Strategies: ***     Ability to Adhere to Complex Medical Regime: {YES:496331}    Adherence History:        Education  _X_ Medicare  _X_ Rehabilitation  _X_ Donor issues  _X_ Community resources  _X_ Post discharge housing  _X_ Financial resources  _X_ Medical insurance options  _X_ Psych adjustment  _X_ Family adjustment  _X_ Health Care Directive - { TRANSPLANT HEALTH CARE DIRECTIVE:072022}   Psychosocial Risks of Transplant Reviewed and Discussed:  _X_ Increased stress related to emotional,            family, social, employment or financial           situation  _X_ Affect on work and/or disability benefits  _X_ Affect on future life insurance  _X_ Transplant outcome expectations may           not be met  _X_ Mental Health Risks: anxiety,           depression, PTSD, guilt, grief and           chronic fatigue     Notable Items:   None noted.       Final Evaluation/Assessment   Patient seemed to process information well. Appeared well informed, motivated and able to follow post transplant requirements. Behavior was appropriate during interview. Has adequate income and insurance coverage. Adequate social support. No major contraindications noted for transplant.  At this time patient appears to understand the risks and benefits of transplant.      Recommendation  {UMP TRANSPLANT  SOTERO:339370761}   Selection Criteria Met:  Plan for support {YES:289439}  Chemical Dependence {YES:355548}  Smoking {YES:285931}  Mental Health {YES:981185}  Adequate Finances {YES:638477}   Signature: SERA Denise, [facility]   Title: Clinical    Psychosocial Assessment  Patient Name/ Age: Nael Silveira 73 year old   Medical Record #: [id number]  Duration of Interview:     ***min  Process:   Face-to-Face Interview                (counseling < 50%)   Present at Appointment: ***        Social  Worker:SERA Denise, [facility] Date:  August 24, 2023        Type of transplant: ***    Donor type:      {UMP TRANSPLANT  DONOR TYPE:147592907}   Prior Transplants:    {YES:870391} Status of Transplant:       Current Living Situation    Location:   [address] [place] [phone]  With Whom: { tx lives with:289541}       Family/ Social Support:     { tx support:313484}   Committed relationship:     { tx relationship:913801}   Other supports:    { tx support:993285}       Activities/ Functional Ability    Current level: {UMP TRANSPLANT  CURRENT LEVEL:590251237}     Transportation {UMP TRANSPLANT  DRIVE:831857535}       Vocational/Employment/Financial     Employment   {UMP TRANSPLANT  EMPLOYMENT:174323865}   Job Description      Income   { tx income:445801}   Insurance      At this time, patient can afford medication costs:  {YES:768899} { TRANSPLANT INSURANCES:891666}       Medical Status    Current mode of treatment for ESRD { TRANSPLANT MODE OF ESRD:774648}   Complications { TRANSPLANT COMPLICATIONS:558980}       Behavioral    Tobacco Use {YES:121545} Chemical Dependency {YES:745834}         Psychiatric Impairment {YES:618364}      Reading ability {GOOD/POOR:861331}  Education Level: { TRANSPLANT EDUCATION LEVEL:326576} Recent Legal History {YES:090422}      Coping Style/Strategies: ***     Ability to Adhere to Complex Medical Regime: {YES:034185}    Adherence History:        Education  _X_ Medicare  _X_ Rehabilitation  _X_ Donor issues  _X_ Community resources  _X_ Post discharge housing  _X_ Financial resources  _X_ Medical insurance options  _X_ Psych adjustment  _X_ Family adjustment  _X_ Health Care Directive - { TRANSPLANT HEALTH CARE DIRECTIVE:054416}   Psychosocial Risks of Transplant Reviewed and Discussed:  _X_ Increased stress related to emotional,            family, social, employment or financial           situation  _X_ Affect on work and/or disability  benefits  _X_ Affect on future life insurance  _X_ Transplant outcome expectations may           not be met  _X_ Mental Health Risks: anxiety,           depression, PTSD, guilt, grief and           chronic fatigue     Notable Items:   None noted.       Final Evaluation/Assessment   Patient seemed to process information well. Appeared well informed, motivated and able to follow post transplant requirements. Behavior was appropriate during interview. Has adequate income and insurance coverage. Adequate social support. No major contraindications noted for transplant.  At this time patient appears to understand the risks and benefits of transplant.      Recommendation  {P TRANSPLANT SW SOTERO:718242027}   Selection Criteria Met:  Plan for support {YES:097680}  Chemical Dependence {YES:635221}  Smoking {YES:811242}  Mental Health {YES:063868}  Adequate Finances {YES:704414}   Signature: SERA Denise, [facility]   Title: Clinical    Expand All Collapse All  TRANSPLANT NEPHROLOGY RECIPIENT EVALUATION NOTE     Assessment and Plan:  # Kidney/Pancreas Transplant Evaluation: Patient is a {desc.:510916} candidate overall. Benefits of a living donor transplant were discussed.     # CKD from ***Diabetic Nephropathy: ***     # Type 2 Diabetes: ***A1c 9.9%6.2 on 7/23/2023     # Cardiac Risk: CAD CABG 2003 in [place], HFrEF (LVEF 40%***), moderate-to-severe mitral stenosis s/p MV repair in 2003, now awaiting valve replacement ***. On Plavix  No heart attacts\, no stents placedl, stress test in negative in march 2023   was able to walk for some time, had to stop becase of SOB.   Able to get o 2 level stairs and become some SOB    # PAD Screening: {FV TX RENAL PAD SCREENING (Optional):397449}     # Obesity: BMI 37 ***.     # Thrombocytopenia: ***108k     # Microcytic Anemia: ***     # Mildly Elevated AST: ***repeat. INR 1.23. Platelets 108k. Liver imaging ***     # Positive RPR: awaiting confirmatory tests.     # Health  Maintenance: Colonoscopy: Up to date and Dental: {[facility] TX UP TO DATE:709851272}  All your vaccine were up to date  Mrna  Pneumo PCV 13 nd 20 23   Paul, 2012  TD in 2015     Quit smoking completely 25 year ago     Discussed the risks and benefits of a transplant, including the risk of surgery and immunosuppression medications.  Patient's overall evaluation will be discussed in the Transplant Program's regular meeting with a final recommendation on the patients suitability for transplant to be made at that time.     { RENAL TX [facility] KIDNEY CANDIDATE (Optional):46662582}  Patient was seen in conjunction with { RENAL TX [facility] NEPHROLOGIST (Optional):83266001} as part of a shared visit.     Evaluation:  Junito Ortez was seen in consultation at the request of  {Referring Surgeon:71395158} for evaluation as a potential {[facility] Living donor organ:195135} transplant recipient.     Reason for Visit:  Junito Ortez is a 60 year old male with {[facility] TRANSPLANT ESKD/CKD/DM:699486384}, who presents for {[facility] Living donor organ:353329} transplant evaluation.     History of Present Illness:  ***          Kidney Disease Hx: patient first recalls being told about CKD ~2010. ***Reports recent eGFR with his PCP last week was 26 ml/min. Feeling ok overall.       Kidney Disease Dx: { :798471}       Biopsy Proven: {YES WITH WILD CARD/NO:89265105}         On Dialysis: {[facility] YES NO NEPH:092478057}       Primary Nephrologist: Dr. Chapman       H/o Kidney Stones: {YES WITH WILD CARD/NO:44738437}       H/o Recurrent/Frequent UTI: {Yes/No:33237048}          Diabetic Hx: Type 2        Diagnosis Date: 1997 on meds but eventuilly started on insulin       Medication History: currently on Tresiba 98 units daily, Ozempic 2 mg weekly, 150-160 units novolog on average per day       Diabetic Control: Poorly controlled (HbA1c >9%)   Last HbA1c: 9.9%       Hypoglycemic Unawareness: No       End-Organ Damage due to DM: { Renal Tx Diabetes  Complications:203027}    positive retinopathy, have retinologiest       Cardiac/Vascular Disease Risk Factors:        Cardiac Risk Factors: {Cardiac Risk Factors:714958}       Known CAD: {YES WITH WILD CARD/NO:01975741}       Known PAD/Caludication Symptoms: {YES WITH WILD CARD/NO:73086817}       Known Heart Failure: {Cardiac Risk Factors:713907}       Arrhythmia: {YES WITH WILD CARD/NO:85177174}       Pulmonary Hypertension: {YES WITH WILD CARD/NO:49626155}       Valvular Disease: {YES WITH WILD CARD/NO:68950342}       Other: {Cardiac Risk Factors:665917}          Viral Serology Status       CMV IgG Antibody: {FV RENAL POS/NEG/UNK ***:289831}       EBV IgG Antibody: {FV RENAL POS/NEG/UNK ***:283758}          Volume Status/Weight:        Volume status: { :142950}       Weight:  {FV RENAL TX Recip Eval Weight:855276}       BMI: Body mass index is 37.62 kg/m .          Functional Capacity/Frailty:        No exercise currently. He is limited by fatigue, SOB, and lightheadedness. Denies chest pain. Can walk 2 blocks, last did so a few days ago. Does report bilateral calf claudication.      Fatigue/Decreased Energy: [] No [x] Yes  little bit   Chest Pain or SOB with Exertion: [x] No [] Yes     Significant Weight Change: [x] No [] Yes     Nausea, Vomiting or Diarrhea: [x] No [] Yes     Fever, Sweats or Chills:  [x] No [] Yes     Leg Swelling [x] No [] Yes        Allergy Testing Questions:              Medication that caused a reaction {FV RENAL ID MEDICATION ALLERGIC RX (Optional):774182}              Antibiotics used that didn't give an allergic reaction?  {FV RENAL ID NO ALLERGIC REACTION (Optional):672825}     COVID Vaccination Up To Date: { :287094}     Potential Living Kidney Donors: { :985836}     Review of Systems:  {[facility] TX FRANCINE:242180152}     Past Medical History:   Medical record was reviewed and PMH was discussed with patient and noted below.  Past Medical History        Past Medical History:   Diagnosis Date     Congestive heart failure (H)      Coronary artery disease      Depressive disorder      Diabetes (H)       DM Type 2    History of blood transfusion      Hypertension              Past Social History:   Past Surgical History         Past Surgical History:   Procedure Laterality Date    CARDIAC SURGERY   2003     Triple Bypass    COLONOSCOPY             Personal history of bleeding or anesthesia problems: {YES WITH WILD CARD/NO:99563369}     Family History:  Family History   No family history on file.        Personal History:   Social History   Social History            Socioeconomic History    Marital status: Single       Spouse name: Not on file    Number of children: Not on file    Years of education: Not on file    Highest education level: Not on file   Occupational History    Not on file   Tobacco Use    Smoking status: Never    Smokeless tobacco: Never   Substance and Sexual Activity    Alcohol use: Never    Drug use: Never    Sexual activity: Not on file   Other Topics Concern    Not on file   Social History Narrative    Not on file      Social Determinants of Health      Financial Resource Strain: Not on file   Food Insecurity: Not on file   Transportation Needs: Not on file   Physical Activity: Not on file   Stress: Not on file   Social Connections: Not on file   Intimate Partner Violence: Not on file   Housing Stability: Not on file            Allergies:       Allergies   Allergen Reactions    Dulaglutide      Duloxetine Hcl      Lisinopril      Prednisone Nausea         \calcitriol 3 days a week   Hydralazine 2 time a day   Vitamin D 2 tabs once a day  Novolog, 8 units 3 times a day on average  Takes about 50 units of glargine at night.  Have to wake up twice during night to urinate, feels like completely empty the bladder  No rash on body   No hematuria   Familiy membners no h/o renal disease   Aortic aneurysms, parents and 2brother and sister, no personal history and image was negative for aneurysms  No  personal history of cancers, never treated for one

## 2023-08-21 NOTE — LETTER
8/21/2023         RE: Nael Silveira  [address] [facility]  [place] [address]        Dear Colleague,    Thank you for referring your patient, Nael Silveira, to the [facility]PL[facility]. Please see a copy of my visit note below.    TRANSPLANT NEPHROLOGY RECIPIENT EVALUATION NOTE    Assessment and Plan:  # Kidney Transplant Evaluation: Patient is a good candidate overall. Benefits of a living donor transplant were discussed.    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 Had pharmacological stress test which was negative for inducible ischemia in march 2023.    # Family h/o aortic aneurysms, Pt had abdominal us which was negative for aortic aneurysm.     # PAD Screening: No symptoms with exertion. Will discuss utility of imaging with surgery.    # Hypertension : BP in clinic today were not controlled well, but reported usually < 140 systolic. Denied hypotensive symptoms today.    # secondary hyperparathyroidism : PTH is elevated at ~200's. Currently on calcitriol 0.25mcg 3 times a week.    # Anemia of chronic kidney disease : Hb is low at ~11, but been stable.     # Obesity:  Noted BMI fo 34.87. Encouraged life style modifications to loose weight.    # Health Maintenance: Colonoscopy: last one done in October 2019, no biopsies done during that time, recommended to have repeat colonoscopy in 5 years, Due next year.  Dermatology: Not indicated  Dental: Up to date    Discussed the risks and benefits of a transplant, including the risk of surgery and immunosuppression medications.  Patient's overall evaluation will be discussed in the Transplant Program's regular meeting with a final recommendation on the patients suitability for transplant to be made at that time.    Evaluation:  Nael Silveira was seen in consultation at the request of Dr. Shoshana Hopson for evaluation as a potential kidney transplant  recipient.    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    History of Present Illness:  Pt presented for kidney evaluation. Pt was asymptomatic today. Denied any recent illnesses. Denied fevers/chills, nausea/vomiting diarrhea, abdominal pain, chest pain or SOB. Also denied hematuria, rash/joint aches. No family history of renal disease or autoimmune disease that he knows of.          Kidney Disease Hx:        Known CKD since 2016. Been slowly trending down over the courase of last couple years. Since 2018, his creatinine been ~ 2- 2.5, slowlyl trended up to 2.5-3 in 2019 to 2022, but been > 3 since 2023 with current creatinine of 3.46 and eGFR of 18 ml. No uremic symptoms endorsed today.       Kidney Disease Dx: Diabetic nephropathy       Biopsy Proven: No         On Dialysis: No       Primary Nephrologist: Dr. Elan Dorantes       H/o Kidney Stones: No       H/o Recurrent/Frequent UTI: No         Diabetic Hx: Type 2        Diagnosis Date: was on oral medications but started/continue on insulin later        Medication History: insulin glargine 50 units daily, Novolog 8 units 3 times a day with meals.       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       Known CAD: No       Known PAD/Caludication Symptoms: No       Known Heart Failure: No       Arrhythmia: No       Pulmonary Hypertension: No       Valvular Disease: No       Other: None         Viral Serology Status       CMV IgG Antibody: Unknown       EBV IgG Antibody: Unknown         Volume Status/Weight:        Volume status: Euvolemic       Weight:  BMI above guidelines, but acceptable for  transplant       BMI: Body mass index is 34.87 kg/m .         Functional Capacity/Frailty:        Feels fatigued after walking couple blocks but denied SOB or chest pain or LE pain with exertion.    Fatigue/Decreased Energy: [] No [x] Yes    Chest Pain or SOB with Exertion: [x] No [] Yes    Significant Weight Change: [x] No [] Yes    Nausea, Vomiting or Diarrhea: [x] No [] Yes    Fever, Sweats or Chills:  [x] No [] Yes    Leg Swelling [x] No [] Yes        History of Cancer: None    Other Significant Medical Issues: None    Possible Higher Risk Donor Option Preferences:  Not discussed in today's visit    Allergy Testing Questions:   Medication that caused a reaction None     Antibiotics used that didn't give an allergic reaction?  Patient doesn't know    COVID Vaccination Up To Date: Yes    Potential Living Kidney Donors: No    Review of Systems:  A comprehensive review of systems was obtained and negative, except as noted in the HPI or PMH.    Past Medical History:   Medical record was reviewed and PMH was discussed with patient and noted below.  Past Medical History:   Diagnosis Date     Anemia of chronic renal failure      Diabetes mellitus, type 2 (H)     diagnosed at age 46 years old, started insulin at about age 64     Hyperlipidemia      Hypertension      Secondary renal hyperparathyroidism (H)        Past Social History:   Past Surgical History:   Procedure Laterality Date     CATARACT EXTRACTION Left      CATARACT EXTRACTION Right      RELEASE CARPAL TUNNEL       Personal history of bleeding or anesthesia problems: No    Family History:  No family history on file.   No know family history of renal disease or autoimmune disease   But known aortic aneurysms in mother, father, 2 brothers and sister.    Personal History:   Social History     Socioeconomic History     Marital status:      Spouse name: Not on file     Number of children: Not on file     Years of education: Not on file     Highest education  level: Not on file   Occupational History     Not on file   Tobacco Use     Smoking status: Former     Packs/day: 1.00     Types: Cigarettes     Start date:      Quit date:      Years since quittin.6     Smokeless tobacco: Never   Substance and Sexual Activity     Alcohol use: Yes     Comment: Socially     Drug use: Never     Sexual activity: Not on file   Other Topics Concern     Not on file   Social History Narrative     Not on file     Social Determinants of Health     Financial Resource Strain: Not on file   Food Insecurity: Not on file   Transportation Needs: Not on file   Physical Activity: Not on file   Stress: Not on file   Social Connections: Not on file   Intimate Partner Violence: Not on file   Housing Stability: Not on file       Allergies:  No Known Allergies    Medications:  Current Outpatient Medications   Medication Sig     amLODIPine (NORVASC) 10 MG tablet TAKE 1 TABLET ONCE DAILY for 90     atorvastatin (LIPITOR) 20 MG tablet TAKE 1 TABLET ONCE DAILY for 90     BD PEN NEEDLE COLTON 2ND GEN 32G X 4 MM miscellaneous      calcitRIOL (ROCALTROL) 0.25 MCG capsule 1 capsule     FLUoxetine (PROZAC) 20 MG capsule TAKE 1 CAPSULE ONCE DAILY for 90     hydrALAZINE (APRESOLINE) 100 MG tablet Take 100 mg by mouth     hydrochlorothiazide (HYDRODIURIL) 12.5 MG tablet TAKE 1 TABLET ONCE DAILY for 90     insulin glargine (BASAGLAR KWIKPEN) 100 UNIT/ML pen 50 units Subcutaneous before bed     isosorbide mononitrate (IMDUR) 60 MG 24 hr tablet TAKE 1 TABLET ONCE DAILY for 90     losartan (COZAAR) 25 MG tablet 0.5 tablet Orally once a day     NOVOLOG FLEXPEN 100 UNIT/ML soln Take 8 units - 3x/day     study - aspirin, IDS# [id number], 81 mg tablet Take 81 mg by mouth     tamsulosin (FLOMAX) 0.4 MG capsule Take 1 capsule by mouth daily     TRULICITY 1.5 MG/0.5ML pen Take 1.5mg injection - 1x/week for 84     Vitamin D3 (CHOLECALCIFEROL) 25 mcg (1000 units) tablet 1 tablet Orally Once a day for 30 day(s)     No  "current facility-administered medications for this visit.       Vitals:  BP (!) 162/68   Pulse 65   Ht 1.778 m (5' 10\")   Wt 110.2 kg (243 lb)   SpO2 98%   BMI 34.87 kg/m      Exam:  GENERAL APPEARANCE: alert and no distress  HENT: mouth without ulcers or lesions  RESP: lungs clear to auscultation - no rales, rhonchi or wheezes  CV: regular rhythm, normal rate, no rub, no murmur  EDEMA: no LE edema bilaterally  ABDOMEN: soft, nondistended, nontender, bowel sounds normal  MS: extremities normal - no gross deformities noted, no evidence of inflammation in joints, no muscle tenderness  SKIN: no rash  NEURO: mentation intact and speech normal  PSYCH: mentation appears normal and affect normal/bright    Results:   Recent Results (from the past 336 hour(s))   Protein  random urine    Collection Time: 08/21/23 12:56 PM   Result Value Ref Range    Total Protein Urine mg/dL 93.9 (H)   mg/dL    Total Protein UR MG/MG CR 0.73 (H) 0.00 - 0.20 mg/mg Cr    Creatinine Urine mg/dL 128.0 mg/dL   UA with Microscopic reflex to Culture    Collection Time: 08/21/23 12:56 PM    Specimen: Urine, Midstream   Result Value Ref Range    Color Urine Light Yellow Colorless, Straw, Light Yellow, Yellow    Appearance Urine Clear Clear    Glucose Urine 300 (A) Negative mg/dL    Bilirubin Urine Negative Negative    Ketones Urine Negative Negative mg/dL    Specific Gravity Urine 1.016 1.003 - 1.035    Blood Urine Negative Negative    pH Urine 6.5 5.0 - 7.0    Protein Albumin Urine 100 (A) Negative mg/dL    Urobilinogen Urine Normal Normal, 2.0 mg/dL    Nitrite Urine Negative Negative    Leukocyte Esterase Urine Negative Negative    RBC Urine 1 <=2 /HPF    WBC Urine <1 <=5 /HPF    Squamous Epithelials Urine 1 <=1 /HPF    Hyaline Casts Urine 1 <=2 /LPF   CBC with platelets    Collection Time: 08/21/23  1:00 PM   Result Value Ref Range    WBC Count 4.3 4.0 - 11.0 10e3/uL    RBC Count 3.61 (L) 4.40 - 5.90 10e6/uL    Hemoglobin 11.0 (L) 13.3 - 17.7 " g/dL    Hematocrit 31.9 (L) 40.0 - 53.0 %    MCV 88 78 - 100 fL    MCH 30.5 26.5 - 33.0 pg    MCHC 34.5 31.5 - 36.5 g/dL    RDW 12.4 10.0 - 15.0 %    Platelet Count 131 (L) 150 - 450 10e3/uL   Comprehensive metabolic panel    Collection Time: 08/21/23  1:00 PM   Result Value Ref Range    Sodium 142 136 - 145 mmol/L    Potassium 3.9 3.4 - 5.3 mmol/L    Chloride 107 98 - 107 mmol/L    Carbon Dioxide (CO2) 25 22 - 29 mmol/L    Anion Gap 10 7 - 15 mmol/L    Urea Nitrogen 52.0 (H) 8.0 - 23.0 mg/dL    Creatinine 3.34 (H) 0.67 - 1.17 mg/dL    Calcium 9.1 8.8 - 10.2 mg/dL    Glucose 254 (H) 70 - 99 mg/dL    Alkaline Phosphatase 75 40 - 129 U/L    AST 21 0 - 45 U/L    ALT 21 0 - 70 U/L    Protein Total 6.4 6.4 - 8.3 g/dL    Albumin 4.2 3.5 - 5.2 g/dL    Bilirubin Total 0.4 <=1.2 mg/dL    GFR Estimate 19 (L) >60 mL/min/1.73m2   Hemoglobin A1c    Collection Time: 08/21/23  1:00 PM   Result Value Ref Range    Hemoglobin A1C 6.4 (H) <5.7 %   Hepatitis B core antibody    Collection Time: 08/21/23  1:00 PM   Result Value Ref Range    Hepatitis B Core Antibody Total Nonreactive Nonreactive   Hepatitis B Surface Antibody    Collection Time: 08/21/23  1:00 PM   Result Value Ref Range    Hepatitis B Surface Antibody Instrument Value 0.39 <8.00 m[IU]/mL    Hepatitis B Surface Antibody Nonreactive    Hepatitis B surface antigen    Collection Time: 08/21/23  1:00 PM   Result Value Ref Range    Hepatitis B Surface Antigen Nonreactive Nonreactive   Parathyroid Hormone Intact    Collection Time: 08/21/23  1:00 PM   Result Value Ref Range    Parathyroid Hormone Intact 197 (H) 15 - 65 pg/mL   Uric acid    Collection Time: 08/21/23  1:00 PM   Result Value Ref Range    Uric Acid 8.3 (H) 3.4 - 7.0 mg/dL   Hepatitis C antibody    Collection Time: 08/21/23  1:00 PM   Result Value Ref Range    Hepatitis C Antibody Nonreactive Nonreactive   HIV Antigen Antibody Combo Pretransplant    Collection Time: 08/21/23  1:00 PM   Result Value Ref Range    HIV  Antigen Antibody Combo Pretransplant Nonreactive Nonreactive   Prostate Specific Antigen Screen    Collection Time: 08/21/23  1:00 PM   Result Value Ref Range    Prostate Specific Antigen Screen 0.74 0.00 - 6.50 ng/mL   INR    Collection Time: 08/21/23  1:00 PM   Result Value Ref Range    INR 0.99 0.85 - 1.15   Adult Type and Screen    Collection Time: 08/21/23  1:00 PM   Result Value Ref Range    ABO/RH(D) O POS     Antibody Screen Negative Negative    SPECIMEN EXPIRATION DATE [id number]    ABO and Rh    Collection Time: 08/21/23  1:01 PM   Result Value Ref Range    ABO/RH(D) O POS     SPECIMEN EXPIRATION DATE [id number]    EKG 12-lead, tracing only [EKG1]    Collection Time: 08/21/23  1:03 PM   Result Value Ref Range    Systolic Blood Pressure  mmHg    Diastolic Blood Pressure  mmHg    Ventricular Rate 74 BPM    Atrial Rate 74 BPM    VT Interval 212 ms    QRS Duration 96 ms     ms    QTc 472 ms    P Axis 49 degrees    R AXIS -41 degrees    T Axis 75 degrees    Interpretation ECG       Sinus rhythm with 1st degree A-V block  Left axis deviation  Incomplete right bundle branch block  Abnormal ECG  No previous ECGs available     Echocardiogram Complete    Collection Time: 08/21/23  1:43 PM   Result Value Ref Range    LVEF  60-65%        Total time spent on the day of clinic visit was 80 min including 40 min with pt and his wife, labs and image review, care every where labs and image review, ordering labs, chart review including previous nephrology note review, and documentation as above.             Again, thank you for allowing me to participate in the care of your patient.        Sincerely,        SHIN

## 2023-08-21 NOTE — PROGRESS NOTES
Transplant Surgery Consult Note     Medical record number: [id number]  YOB: 1950,   Consult requested by Dr Ansari for evaluation of kidney transplant candidacy.    Assessment and Recommendations:Mr. Silveira appears to be a good candidate for kidney transplantation and has a good understanding of the risks and benefits of this approach to the management of renal failure. The following issues should be addressed prior to finalizing his transplant candidacy:   74 yo male with type 2 DM on 20-30 units/d  CKD 4 with GFR 18  No blood transfusions  ABd obese - recommend weight loss of 10 lbs  Blood type UNK    No  live donors at this time  But will look actively as that might be his only option if the wait time is as expected    Risks of the surgical procedure including but not limited to the rare risk of mortality discussed in detail. Patient verbalized good understanding and had several pertinent questions which were answered satisfactorily.     Immunosuppressive regimen, management and long term risks discussed in detail.         Transplant order: Mr. Silveira has Type 2 Diabetes whose condition is not expected to resolve, is expected to progress, and is expected to continue to develop related comorbid conditions.  Recommend he be considered as a candidate for kidney.  Cardiology consult for cardiac risk stratification to be ordered: Yes  CT abdomen and pelvis without contrast to be ordered for assessment of vascular targets: Yes  Transplant listing labs ordered to include HLA, ABOx2, Cr, etc.  Dietician consult ordered: Yes  Social work consult ordered: Yes  Imaging reports reviewed:  yes  Radiology images reviewed:no  Recipient suitable to move forward with work up of living donors:  Yes      The majority of our visit was spent in counselling, discussing the medical and surgical risks of kidney transplantation. We discussed approximate wait time and how that is influenced by issues such as blood  type and sensitization (PRA) and access to a living donor. I contrasted potential waiting time for living vs  donor kidneys from  normal (0-85%) or higher (%) kidney donor profile index (KDPI) donors and their associated outcomes. I would not recommend this individual to consider kidneys from high KDPI donors. The reason for this decision is best summarized as:  patient preference . Potential surgical complications of kidney transplantation include bleeding, superficial or deep wound complications (infection, hernia, lymphocele), ureteral anastomotic failure (leak or stenosis), graft thrombosis, need for reoperation and other issues such as cardiac complications, pneumonia, deep venous thrombosis, pulmonary embolism, post transplant diabetes and death. The potential for recurrent disease or need for retransplantation was also addressed. We discussed the possible need for ureteral stent (and subsequent removal), and the utility of protocol biopsy and laboratory studies to evaluate for rejection or recurrent disease. We discussed the risk of graft rejection, our center's average graft and patient survival rates, immunosuppression protocols, as well as the potential opportunity to participate in clinical trials.  We also discussed the average length of stay, recovery process, and posttransplant lab and monitoring protocol.  I emphasized the need for strict immunosuppression medication adherence and the potential for complications of immunosuppression such as skin cancer or lymphoma, as well as a very low but not zero risk of donor-derived disease transmission risks (infection, cancer). Mr. Silveira asked good questions and his candidacy will be reviewed at our Multidisciplinary Selection Committee. Thank you for the opportunity to participate in Mr. Silveira's care.      Total time: 60 minutes  Counselling time: 30 minutes    .  Marlen Fonseca in Immunology and  Transplantation  Surgical Director, Kidney & Pancreas Transplant Programs  Medical Director, Solid Organ Transplant Unit    ---------------------------------------------------------------------------------------------------    HPI: Mr. Silveira has Type 2 Diabetes. The patient is non-diabetic.       The patient is not on dialysis.    Has potential kidney donors:  Doesn't know .  Interested in participation in paired exchange if a donor is willing: Doesn't know     The patient has the following pertinent history:       No    Yes  Dialysis:    []      [] via:       Blood Transfusion                  []      []  Number of units:   Most recently:  Pregnancy:    []      [] Number:       Previous Transplant:  []      [] Details:    Cancer    []      [] Comment:   Kidney stones   []      [] Comment:      Recurrent infections  []      []  Type:                  Bladder dysfunction  []      [] Cause:    Claudication   []      [] Distance:    Previous Amputation  []      [] Cause:     Chronic anticoagulation  []      [] Indication:   Rastafarian  []      []      Past Medical History:   Diagnosis Date    Anemia of chronic renal failure     Diabetes mellitus, type 2 (H)     diagnosed at age 46 years old, started insulin at about age 64    Hyperlipidemia     Hypertension     Secondary renal hyperparathyroidism (H)      Past Surgical History:   Procedure Laterality Date    CATARACT EXTRACTION Left     CATARACT EXTRACTION Right     RELEASE CARPAL TUNNEL       No family history on file.  Social History     Socioeconomic History    Marital status:      Spouse name: Not on file    Number of children: Not on file    Years of education: Not on file    Highest education level: Not on file   Occupational History    Not on file   Tobacco Use    Smoking status: Former     Packs/day: 1.00     Types: Cigarettes     Start date:      Quit date:      Years since quittin.6    Smokeless tobacco: Never   Substance and  Sexual Activity    Alcohol use: Yes     Comment: Socially    Drug use: Never    Sexual activity: Not on file   Other Topics Concern    Not on file   Social History Narrative    Not on file     Social Determinants of Health     Financial Resource Strain: Not on file   Food Insecurity: Not on file   Transportation Needs: Not on file   Physical Activity: Not on file   Stress: Not on file   Social Connections: Not on file   Intimate Partner Violence: Not on file   Housing Stability: Not on file       ROS:   CONSTITUTIONAL:  No fevers or chills  EYES: negative for icterus  ENT:  negative for hearing loss, tinnitus and sore throat  RESPIRATORY:  negative for cough, sputum, dyspnea  CARDIOVASCULAR:  negative for chest pain Fatigue  GASTROINTESTINAL:  negative for nausea, vomiting, diarrhea or constipation  GENITOURINARY:  negative for incontinence, dysuria, bladder emptying problems  HEME:  No easy bruising  INTEGUMENT:  negative for rash and pruritus  NEURO:  Negative for headache, seizure disorder  Allergies:   No Known Allergies  Medications:  Prescription Medications as of 2023         Rx Number Disp Refills Start End Last Dispensed Date Next Fill Date Owning Pharmacy    amLODIPine (NORVASC) 10 MG tablet            Sig: TAKE 1 TABLET ONCE DAILY for 90    Class: Historical    atorvastatin (LIPITOR) 20 MG tablet    2022        Sig: TAKE 1 TABLET ONCE DAILY for 90    Class: Historical    BD PEN NEEDLE COLTON 2ND GEN 32G X 4 MM miscellaneous    2/10/2023        Class: Historical    calcitRIOL (ROCALTROL) 0.25 MCG capsule    2023        Si capsule    Class: Historical    FLUoxetine (PROZAC) 20 MG capsule    2021        Sig: TAKE 1 CAPSULE ONCE DAILY for 90    Class: Historical    hydrALAZINE (APRESOLINE) 100 MG tablet    2022        Sig: Take 100 mg by mouth    Class: Historical    Route: Oral    hydrochlorothiazide (HYDRODIURIL) 12.5 MG tablet            Sig: TAKE 1 TABLET ONCE DAILY for 90     Class: Historical    insulin glargine (BASAGLAR KWIKPEN) 100 UNIT/ML pen    10/15/2021        Si units Subcutaneous before bed    Class: Historical    isosorbide mononitrate (IMDUR) 60 MG 24 hr tablet    2023        Sig: TAKE 1 TABLET ONCE DAILY for 90    Class: Historical    losartan (COZAAR) 25 MG tablet    3/2/2023        Si.5 tablet Orally once a day    Class: Historical    NOVOLOG FLEXPEN 100 UNIT/ML soln            Sig: Take 8 units - 3x/day    Class: Historical    study - aspirin, IDS# [id number], 81 mg tablet            Sig: Take 81 mg by mouth    Class: Historical    Route: Oral    tamsulosin (FLOMAX) 0.4 MG capsule    2022        Sig: Take 1 capsule by mouth daily    Class: Historical    Route: Oral    TRULICITY 1.5 MG/0.5ML pen            Sig: Take 1.5mg injection - 1x/week for 84    Class: Historical    Vitamin D3 (CHOLECALCIFEROL) 25 mcg (1000 units) tablet            Si tablet Orally Once a day for 30 day(s)    Class: Historical          Exam:     There were no vitals taken for this visit.  Appearance: in no apparent distress.   Skin: normal  Eyes:  no redness or discharge.  Sclera anicteric  Head and Neck: Normal, no rashes or jaundice  Respiratory: easy respirations, no audible wheezing.  Abdomen: protuberant, No surgical scars     Psychiatric: Normal mood and affect    Diagnostics:   No results found for this or any previous visit (from the past 672 hour(s)).  No results found for: CPRA

## 2023-08-21 NOTE — PROGRESS NOTES
Kidney Transplant Referral - 6/21/2023  Patient attended appointments accompanied by wife  Patient completed AM appointments with all [facility] providers.  Time and location of PM appointments reviewed with patient.  Patient instructed next contact from Transplant Coordinator will be following Selection Committee  Patient stated understanding  Patient states Receipt of Information for Organ Transplant Recipient form signed via TabbedOut form signed and faxed to HIM  Patient states he has watched [facility] Pre Kidney Transplant video #1     Summary    Team s concerns/comments:   1) Cardiac risk assessment  2) PAD assessment  3) Central Obesity  4) Secondary Hyperparathyroidism  5) Health maintenance    Candidacy category: Yellow    Action/Plan:   1) EKg, Echocardiogram today. Stress tests completed 3/23. Cardiac clearance  2) A/P CT without contrast  3) Surgery recommends weight loss of 10 lbs  4) On calcitriol  5) UTD, PSA today      Expected Selection Meeting Discussion: 8/30/23

## 2023-08-21 NOTE — PROGRESS NOTES
TRANSPLANT NEPHROLOGY RECIPIENT EVALUATION NOTE    Assessment and Plan:  # Kidney Transplant Evaluation: Patient is a good candidate overall. Benefits of a living donor transplant were discussed.    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 Had pharmacological stress test which was negative for inducible ischemia in march 2023.    # Family h/o aortic aneurysms, Pt had abdominal us which was negative for aortic aneurysm.     # PAD Screening: No symptoms with exertion. Will discuss utility of imaging with surgery.    # Hypertension : BP in clinic today were not controlled well, but reported usually < 140 systolic. Denied hypotensive symptoms today.    # secondary hyperparathyroidism : PTH is elevated at ~200's. Currently on calcitriol 0.25mcg 3 times a week.    # Anemia of chronic kidney disease : Hb is low at ~11, but been stable.     # Obesity:  Noted BMI fo 34.87. Encouraged life style modifications to loose weight.    # Health Maintenance: Colonoscopy: last one done in October 2019, no biopsies done during that time, recommended to have repeat colonoscopy in 5 years, Due next year.  Dermatology: Not indicated  Dental: Up to date    Discussed the risks and benefits of a transplant, including the risk of surgery and immunosuppression medications.  Patient's overall evaluation will be discussed in the Transplant Program's regular meeting with a final recommendation on the patients suitability for transplant to be made at that time.    Evaluation:  Nael Silveira was seen in consultation at the request of Dr. Shoshana Hopson for evaluation as a potential kidney transplant recipient.    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    History of Present Illness:  Pt presented for kidney evaluation. Pt was asymptomatic today. Denied any  recent illnesses. Denied fevers/chills, nausea/vomiting diarrhea, abdominal pain, chest pain or SOB. Also denied hematuria, rash/joint aches. No family history of renal disease or autoimmune disease that he knows of.          Kidney Disease Hx:        Known CKD since 2016. Been slowly trending down over the courase of last couple years. Since 2018, his creatinine been ~ 2- 2.5, slowlyl trended up to 2.5-3 in 2019 to 2022, but been > 3 since 2023 with current creatinine of 3.46 and eGFR of 18 ml. No uremic symptoms endorsed today.       Kidney Disease Dx: Diabetic nephropathy       Biopsy Proven: No         On Dialysis: No       Primary Nephrologist: Dr. Elan Dorantes       H/o Kidney Stones: No       H/o Recurrent/Frequent UTI: No         Diabetic Hx: Type 2        Diagnosis Date: was on oral medications but started/continue on insulin later        Medication History: insulin glargine 50 units daily, Novolog 8 units 3 times a day with meals.       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       Known CAD: No       Known PAD/Caludication Symptoms: No       Known Heart Failure: No       Arrhythmia: No       Pulmonary Hypertension: No       Valvular Disease: No       Other: None         Viral Serology Status       CMV IgG Antibody: Unknown       EBV IgG Antibody: Unknown         Volume Status/Weight:        Volume status: Euvolemic       Weight:  BMI above guidelines, but acceptable for transplant       BMI: Body mass index is 34.87 kg/m .         Functional Capacity/Frailty:        Feels fatigued after walking couple blocks but denied SOB or chest pain or LE pain with exertion.    Fatigue/Decreased Energy: [] No [x] Yes    Chest Pain or SOB with Exertion:  [x] No [] Yes    Significant Weight Change: [x] No [] Yes    Nausea, Vomiting or Diarrhea: [x] No [] Yes    Fever, Sweats or Chills:  [x] No [] Yes    Leg Swelling [x] No [] Yes        History of Cancer: None    Other Significant Medical Issues: None    Possible Higher Risk Donor Option Preferences:  Not discussed in today's visit    Allergy Testing Questions:   Medication that caused a reaction None     Antibiotics used that didn't give an allergic reaction?  Patient doesn't know    COVID Vaccination Up To Date: Yes    Potential Living Kidney Donors: No    Review of Systems:  A comprehensive review of systems was obtained and negative, except as noted in the HPI or PMH.    Past Medical History:   Medical record was reviewed and PMH was discussed with patient and noted below.  Past Medical History:   Diagnosis Date     Anemia of chronic renal failure      Diabetes mellitus, type 2 (H)     diagnosed at age 46 years old, started insulin at about age 64     Hyperlipidemia      Hypertension      Secondary renal hyperparathyroidism (H)        Past Social History:   Past Surgical History:   Procedure Laterality Date     CATARACT EXTRACTION Left      CATARACT EXTRACTION Right      RELEASE CARPAL TUNNEL       Personal history of bleeding or anesthesia problems: No    Family History:  No family history on file.   No know family history of renal disease or autoimmune disease   But known aortic aneurysms in mother, father, 2 brothers and sister.    Personal History:   Social History     Socioeconomic History     Marital status:      Spouse name: Not on file     Number of children: Not on file     Years of education: Not on file     Highest education level: Not on file   Occupational History     Not on file   Tobacco Use     Smoking status: Former     Packs/day: 1.00     Types: Cigarettes     Start date:      Quit date:      Years since quittin.6     Smokeless tobacco: Never   Substance and Sexual Activity  "    Alcohol use: Yes     Comment: Socially     Drug use: Never     Sexual activity: Not on file   Other Topics Concern     Not on file   Social History Narrative     Not on file     Social Determinants of Health     Financial Resource Strain: Not on file   Food Insecurity: Not on file   Transportation Needs: Not on file   Physical Activity: Not on file   Stress: Not on file   Social Connections: Not on file   Intimate Partner Violence: Not on file   Housing Stability: Not on file       Allergies:  No Known Allergies    Medications:  Current Outpatient Medications   Medication Sig     amLODIPine (NORVASC) 10 MG tablet TAKE 1 TABLET ONCE DAILY for 90     atorvastatin (LIPITOR) 20 MG tablet TAKE 1 TABLET ONCE DAILY for 90     BD PEN NEEDLE COLTON 2ND GEN 32G X 4 MM miscellaneous      calcitRIOL (ROCALTROL) 0.25 MCG capsule 1 capsule     FLUoxetine (PROZAC) 20 MG capsule TAKE 1 CAPSULE ONCE DAILY for 90     hydrALAZINE (APRESOLINE) 100 MG tablet Take 100 mg by mouth     hydrochlorothiazide (HYDRODIURIL) 12.5 MG tablet TAKE 1 TABLET ONCE DAILY for 90     insulin glargine (BASAGLAR KWIKPEN) 100 UNIT/ML pen 50 units Subcutaneous before bed     isosorbide mononitrate (IMDUR) 60 MG 24 hr tablet TAKE 1 TABLET ONCE DAILY for 90     losartan (COZAAR) 25 MG tablet 0.5 tablet Orally once a day     NOVOLOG FLEXPEN 100 UNIT/ML soln Take 8 units - 3x/day     study - aspirin, IDS# [id number], 81 mg tablet Take 81 mg by mouth     tamsulosin (FLOMAX) 0.4 MG capsule Take 1 capsule by mouth daily     TRULICITY 1.5 MG/0.5ML pen Take 1.5mg injection - 1x/week for 84     Vitamin D3 (CHOLECALCIFEROL) 25 mcg (1000 units) tablet 1 tablet Orally Once a day for 30 day(s)     No current facility-administered medications for this visit.       Vitals:  BP (!) 162/68   Pulse 65   Ht 1.778 m (5' 10\")   Wt 110.2 kg (243 lb)   SpO2 98%   BMI 34.87 kg/m      Exam:  GENERAL APPEARANCE: alert and no distress  HENT: mouth without ulcers or lesions  RESP: " lungs clear to auscultation - no rales, rhonchi or wheezes  CV: regular rhythm, normal rate, no rub, no murmur  EDEMA: no LE edema bilaterally  ABDOMEN: soft, nondistended, nontender, bowel sounds normal  MS: extremities normal - no gross deformities noted, no evidence of inflammation in joints, no muscle tenderness  SKIN: no rash  NEURO: mentation intact and speech normal  PSYCH: mentation appears normal and affect normal/bright    Results:   Recent Results (from the past 336 hour(s))   Protein  random urine    Collection Time: 08/21/23 12:56 PM   Result Value Ref Range    Total Protein Urine mg/dL 93.9 (H)   mg/dL    Total Protein UR MG/MG CR 0.73 (H) 0.00 - 0.20 mg/mg Cr    Creatinine Urine mg/dL 128.0 mg/dL   UA with Microscopic reflex to Culture    Collection Time: 08/21/23 12:56 PM    Specimen: Urine, Midstream   Result Value Ref Range    Color Urine Light Yellow Colorless, Straw, Light Yellow, Yellow    Appearance Urine Clear Clear    Glucose Urine 300 (A) Negative mg/dL    Bilirubin Urine Negative Negative    Ketones Urine Negative Negative mg/dL    Specific Gravity Urine 1.016 1.003 - 1.035    Blood Urine Negative Negative    pH Urine 6.5 5.0 - 7.0    Protein Albumin Urine 100 (A) Negative mg/dL    Urobilinogen Urine Normal Normal, 2.0 mg/dL    Nitrite Urine Negative Negative    Leukocyte Esterase Urine Negative Negative    RBC Urine 1 <=2 /HPF    WBC Urine <1 <=5 /HPF    Squamous Epithelials Urine 1 <=1 /HPF    Hyaline Casts Urine 1 <=2 /LPF   CBC with platelets    Collection Time: 08/21/23  1:00 PM   Result Value Ref Range    WBC Count 4.3 4.0 - 11.0 10e3/uL    RBC Count 3.61 (L) 4.40 - 5.90 10e6/uL    Hemoglobin 11.0 (L) 13.3 - 17.7 g/dL    Hematocrit 31.9 (L) 40.0 - 53.0 %    MCV 88 78 - 100 fL    MCH 30.5 26.5 - 33.0 pg    MCHC 34.5 31.5 - 36.5 g/dL    RDW 12.4 10.0 - 15.0 %    Platelet Count 131 (L) 150 - 450 10e3/uL   Comprehensive metabolic panel    Collection Time: 08/21/23  1:00 PM   Result Value  Ref Range    Sodium 142 136 - 145 mmol/L    Potassium 3.9 3.4 - 5.3 mmol/L    Chloride 107 98 - 107 mmol/L    Carbon Dioxide (CO2) 25 22 - 29 mmol/L    Anion Gap 10 7 - 15 mmol/L    Urea Nitrogen 52.0 (H) 8.0 - 23.0 mg/dL    Creatinine 3.34 (H) 0.67 - 1.17 mg/dL    Calcium 9.1 8.8 - 10.2 mg/dL    Glucose 254 (H) 70 - 99 mg/dL    Alkaline Phosphatase 75 40 - 129 U/L    AST 21 0 - 45 U/L    ALT 21 0 - 70 U/L    Protein Total 6.4 6.4 - 8.3 g/dL    Albumin 4.2 3.5 - 5.2 g/dL    Bilirubin Total 0.4 <=1.2 mg/dL    GFR Estimate 19 (L) >60 mL/min/1.73m2   Hemoglobin A1c    Collection Time: 08/21/23  1:00 PM   Result Value Ref Range    Hemoglobin A1C 6.4 (H) <5.7 %   Hepatitis B core antibody    Collection Time: 08/21/23  1:00 PM   Result Value Ref Range    Hepatitis B Core Antibody Total Nonreactive Nonreactive   Hepatitis B Surface Antibody    Collection Time: 08/21/23  1:00 PM   Result Value Ref Range    Hepatitis B Surface Antibody Instrument Value 0.39 <8.00 m[IU]/mL    Hepatitis B Surface Antibody Nonreactive    Hepatitis B surface antigen    Collection Time: 08/21/23  1:00 PM   Result Value Ref Range    Hepatitis B Surface Antigen Nonreactive Nonreactive   Parathyroid Hormone Intact    Collection Time: 08/21/23  1:00 PM   Result Value Ref Range    Parathyroid Hormone Intact 197 (H) 15 - 65 pg/mL   Uric acid    Collection Time: 08/21/23  1:00 PM   Result Value Ref Range    Uric Acid 8.3 (H) 3.4 - 7.0 mg/dL   Hepatitis C antibody    Collection Time: 08/21/23  1:00 PM   Result Value Ref Range    Hepatitis C Antibody Nonreactive Nonreactive   HIV Antigen Antibody Combo Pretransplant    Collection Time: 08/21/23  1:00 PM   Result Value Ref Range    HIV Antigen Antibody Combo Pretransplant Nonreactive Nonreactive   Prostate Specific Antigen Screen    Collection Time: 08/21/23  1:00 PM   Result Value Ref Range    Prostate Specific Antigen Screen 0.74 0.00 - 6.50 ng/mL   INR    Collection Time: 08/21/23  1:00 PM   Result  Value Ref Range    INR 0.99 0.85 - 1.15   Adult Type and Screen    Collection Time: 08/21/23  1:00 PM   Result Value Ref Range    ABO/RH(D) O POS     Antibody Screen Negative Negative    SPECIMEN EXPIRATION DATE [id number]    ABO and Rh    Collection Time: 08/21/23  1:01 PM   Result Value Ref Range    ABO/RH(D) O POS     SPECIMEN EXPIRATION DATE [id number]    EKG 12-lead, tracing only [EKG1]    Collection Time: 08/21/23  1:03 PM   Result Value Ref Range    Systolic Blood Pressure  mmHg    Diastolic Blood Pressure  mmHg    Ventricular Rate 74 BPM    Atrial Rate 74 BPM    SD Interval 212 ms    QRS Duration 96 ms     ms    QTc 472 ms    P Axis 49 degrees    R AXIS -41 degrees    T Axis 75 degrees    Interpretation ECG       Sinus rhythm with 1st degree A-V block  Left axis deviation  Incomplete right bundle branch block  Abnormal ECG  No previous ECGs available     Echocardiogram Complete    Collection Time: 08/21/23  1:43 PM   Result Value Ref Range    LVEF  60-65%        Total time spent on the day of clinic visit was 80 min including 40 min with pt and his wife, labs and image review, care every where labs and image review, ordering labs, chart review including previous nephrology note review, and documentation as above.

## 2023-08-22 LAB
ATRIAL RATE - MUSE: 74 BPM
C PEPTIDE SERPL-MCNC: 3.7 NG/ML (ref 0.9–6.9)
CARDIOLIPIN IGG SER IA-ACNC: <2 GPL-U/ML
CARDIOLIPIN IGG SER IA-ACNC: NEGATIVE
CARDIOLIPIN IGM SER IA-ACNC: 3.2 MPL-U/ML
CARDIOLIPIN IGM SER IA-ACNC: NEGATIVE
CMV IGG SERPL IA-ACNC: <0.2 U/ML
CMV IGG SERPL IA-ACNC: NORMAL
DIASTOLIC BLOOD PRESSURE - MUSE: NORMAL MMHG
DRVVT SCREEN RATIO: 0.78
EBV VCA IGG SER IA-ACNC: 535 U/ML
EBV VCA IGG SER IA-ACNC: POSITIVE
GAMMA INTERFERON BACKGROUND BLD IA-ACNC: 0.04 IU/ML
INTERPRETATION ECG - MUSE: NORMAL
LA PPP-IMP: NEGATIVE
LUPUS INTERPRETATION: NORMAL
M TB IFN-G BLD-IMP: NEGATIVE
M TB IFN-G CD4+ BCKGRND COR BLD-ACNC: 9.96 IU/ML
MITOGEN IGNF BCKGRD COR BLD-ACNC: 0 IU/ML
MITOGEN IGNF BCKGRD COR BLD-ACNC: 0 IU/ML
P AXIS - MUSE: 49 DEGREES
PR INTERVAL - MUSE: 212 MS
PTT RATIO: 0.92
QRS DURATION - MUSE: 96 MS
QT - MUSE: 426 MS
QTC - MUSE: 472 MS
QUANTIFERON MITOGEN: 10 IU/ML
QUANTIFERON NIL TUBE: 0.04 IU/ML
QUANTIFERON TB1 TUBE: 0.04 IU/ML
QUANTIFERON TB2 TUBE: 0.04
R AXIS - MUSE: -41 DEGREES
SYSTOLIC BLOOD PRESSURE - MUSE: NORMAL MMHG
T AXIS - MUSE: 75 DEGREES
THROMBIN TIME: 17.7 SECONDS (ref 13–19)
VENTRICULAR RATE- MUSE: 74 BPM
VZV IGG SER QL IA: 2759 INDEX
VZV IGG SER QL IA: POSITIVE

## 2023-08-23 ENCOUNTER — TELEPHONE (OUTPATIENT)
Dept: TRANSPLANT | Facility: CLINIC | Age: 73
End: 2023-08-23

## 2023-08-23 LAB
PROTOCOL CUTOFF: NORMAL
SA 1 CELL: NORMAL
SA 1 TEST METHOD: NORMAL
SA 2 CELL: NORMAL
SA 2 TEST METHOD: NORMAL
SA1 HI RISK ABY: NORMAL
SA1 MOD RISK ABY: NORMAL
SA2 HI RISK ABY: NORMAL
SA2 MOD RISK ABY: NORMAL
UNACCEPTABLE ANTIGENS: NORMAL
UNOS CPRA: 0
ZZZSA 1  COMMENTS: NORMAL
ZZZSA 2 COMMENTS: NORMAL

## 2023-08-23 NOTE — TELEPHONE ENCOUNTER
Patient Call: General  Route to LPN    Reason for call: Nael has some questions regarding some forms he received to his email from Coordinator, patient stated he doesn't understand how to fill out,    Call back needed? Yes    Return Call Needed  Same as documented in contacts section  When to return call?: Same day: Route High Priority

## 2023-08-25 LAB
A*: NORMAL
A*LOCUS SEROLOGIC EQUIVALENT: 11
A*LOCUS: NORMAL
A*SEROLOGIC EQUIVALENT: 32
ABTEST METHOD: NORMAL
B*: NORMAL
B*LOCUS SEROLOGIC EQUIVALENT: 35
B*LOCUS: NORMAL
B*SEROLOGIC EQUIVALENT: 44
BW-1: NORMAL
BW-2: NORMAL
C*: NORMAL
C*LOCUS SEROLOGIC EQUIVALENT: 4
C*LOCUS: NORMAL
C*SEROLOGIC EQUIVALENT: 5
DPA1*: NORMAL
DPB1*: NORMAL
DQA1*: NORMAL
DQA1*LOCUS: NORMAL
DQB1*: NORMAL
DQB1*LOCUS SEROLOGIC EQUIVALENT: 7
DQB1*LOCUS: NORMAL
DQB1*SEROLOGIC EQUIVALENT: 5
DRB1*: NORMAL
DRB1*LOCUS SEROLOGIC EQUIVALENT: 1
DRB1*LOCUS: NORMAL
DRB1*SEROLOGIC EQUIVALENT: 12
DRB3*LOCUS SEROLOGIC EQUIVALENT: 52
DRB3*LOCUS: NORMAL
DRSSO TEST METHOD: NORMAL

## 2023-08-28 ENCOUNTER — TELEPHONE (OUTPATIENT)
Dept: TRANSPLANT | Facility: CLINIC | Age: 73
End: 2023-08-28
Payer: MEDICARE

## 2023-08-28 NOTE — TELEPHONE ENCOUNTER
Spoke with pt and wife, answered questions about consents and finding living donors. Pt and wife expressed very good understanding of all.

## 2023-08-30 ENCOUNTER — COMMITTEE REVIEW (OUTPATIENT)
Dept: TRANSPLANT | Facility: CLINIC | Age: 73
End: 2023-08-30
Payer: MEDICARE

## 2023-08-30 NOTE — COMMITTEE REVIEW
Abdominal Committee Review Note     Evaluation Date: 8/22/2023  Committee Review Date: 8/30/2023    Organ being evaluated for: Kidney    Transplant Phase: Evaluation  Transplant Status: Active    Transplant Coordinator: Kaitlin Rust  Transplant Surgeon:   Dr. Edy Escobar    Referring Physician: Jose E Ansari    Primary Diagnosis: CDK stage IV   Secondary Diagnosis: Diabetes Mellitus Type II    Committee Review Members:  Nephrology Surjit Cook MD, Zee Ivory PA-C, Shazia Martin MD   Nutrition Betsy Chacon, VIKAS   Pharmacist Basilia Vigil, [facility]    - Clinical Maribel Yessenia Cadena, [facility], Ramya Reyes, [facility]   Transplant Lori Aguilar, HOLA, Ramya Talamantes, RN, Yolanda Parker, RAJANI, Yolanda Rousseau, RN, Lynda Gabriel, HOLA, Kimmy Johnston, RN   Transplant Surgery Eyd Escobar MD       Transplant Eligibility: Irreversible chronic kidney disease treated w/dialysis or expected need for dialysis    Committee Review Decision: Approved    Relative Contraindications: None    Absolute Contraindications: None    Committee Chair Surjit Cook MD verbally attested to the committee's decision.    Reviewed pt's medical status and evaluation results to date with multidisciplinary committee.    Recommended the following evaluation items:    -Cardiac Risk Screen and clearance   -Angio if approved donor  -Abdominal weight loss of 10 pounds  -Abdominal Pelvic CT   -Aorto-iliac Ultrasound     Patient should have live donors register now to initiate donor evaluation: Yes    Committee determined that patient is a Good candidate for Kidney     Listing plan: List Inactive     Patient will be called and summary letter will be sent.

## 2023-08-31 ENCOUNTER — DOCUMENTATION ONLY (OUTPATIENT)
Dept: TRANSPLANT | Facility: CLINIC | Age: 73
End: 2023-08-31
Payer: MEDICARE

## 2023-08-31 ENCOUNTER — TELEPHONE (OUTPATIENT)
Dept: TRANSPLANT | Facility: CLINIC | Age: 73
End: 2023-08-31
Payer: MEDICARE

## 2023-08-31 DIAGNOSIS — I10 ESSENTIAL HYPERTENSION: ICD-10-CM

## 2023-08-31 DIAGNOSIS — Z76.82 ORGAN TRANSPLANT CANDIDATE: ICD-10-CM

## 2023-08-31 DIAGNOSIS — N18.4 CHRONIC KIDNEY DISEASE, STAGE IV (SEVERE) (H): Primary | ICD-10-CM

## 2023-08-31 DIAGNOSIS — E11.9 DIABETES MELLITUS, TYPE 2 (H): ICD-10-CM

## 2023-08-31 DIAGNOSIS — Z01.818 PRE-TRANSPLANT EVALUATION FOR KIDNEY TRANSPLANT: ICD-10-CM

## 2023-08-31 NOTE — LETTER
08/31/23        Nael Silveira  [address] [address]        Dear Nael,    It was a pleasure to see you recently for consideration of kidney transplantation. Your pre-transplant evaluation results were reviewed at our Multidisciplinary Selection Committee on 8/30/23. The committee approved you to be listed INACTIVE status to accrue wait list time, and is requesting the following items are completed:     Cardiac Risk Screen and clearance- schedulers will call to arrange  Cardiac angiogram if approved donor  Abdominal weight loss of 10 pounds  Abdominal/Pelvic CT- schedulers will call to arrange  Aorto-iliac Ultrasound- schedulers will call to arrange     For any questions, please contact the Transplant Office at [phone].      Sincerely,  HOLA Rubio   Solid Organ Transplant  [facility], [facility]

## 2023-08-31 NOTE — PROGRESS NOTES
"Kidney Transplant Evaluation - 8/22/2023  My Transplant Education was viewed with patient and discussed.    Content reviewed:  Living Donation and how to access that program  Paired exchange  Kidney Donor Profile Index (KDPI)  Waiting list issues (right to decline without penalty, high PHS risk donors, what to expect when called with an offer)  Hospital experience,  length of stay , need to stay locally post-discharge (2-4 weeks)  Surgical options (with pictures)                           Post-surgery lifting and driving restrictions  Post-transplant routines, frequency of lab work and clinic visits  Need to stay locally post-discharge (2-4 weeks)  Role of Transplant Coordinator    Participants were informed of the benefits of transplant as well as potential risks such as infection, cancer, and death.  The need for total adherence with immunosuppression medications and following transplant regimens was stressed.  The overall evaluation/approval/listing process was reviewed.        The patient was provided with the following documents:  What You Need to Know About a Kidney Transplant  Adult Kidney Transplant - A Guide for Patients  SRTR Data Sheet - Kidney  My Transplant Place - Quick Start Guide  KDPI Consent  Receipt of Information form    Nael Silveira signed the  Receipt of Information for Organ Transplant Recipient.\" He was provided Kaitlin Rust's business card and instructed to call with additional questions.        "

## 2023-08-31 NOTE — LETTER
2023    Nael Silveira  [address] [address]      Dear Mr. Silveira,    This letter is sent to confirm that you have started your transplant work-up and you are a candidate in the kidney transplant program at the [facility].  You were placed on the kidney INACTIVE waitlist on 23.  This means you will accumulate waiting time but not receive  donor calls.       Items we will need from you:    We have received approval from you insurance company for the transplant procedure.  It is critical that you notify us if there is any change in your insurance.  It is also important that you familiarize yourself with the details of your specific insurance policy.  Our patient  is available to assist you if you should have any questions regarding your coverage.    An ALA or PRA blood sample may need to be sent here every 3 months to match you with  donors or any potential living donors. Special mailing boxes (called mailers) will be sent to you directly from the Outreach Department.  You should take the physician order form and the  to your home laboratory when it is time to for this testing to be done.  Additional mailers can be obtained by calling the Transplant Office and asking to speak to a kidney .    During this waiting period, we may request additional periodic laboratory tests with your primary physician.  It will be your responsibility to remind your physician to forward your results to the Transplant Office.    We need to be kept informed of any changes in your medical condition such as:    changes in your medications,   significant changes in your health  significant infections (such as pneumonia or abscesses)  blood transfusions  any condition which requires hospitalization  any surgery  initiation of dialysis    Remember to complete any routine cancer screening  tests required before your transplant.  This includes colonoscopy; prostrate screening for men, and mammogram and gynecologic testing for women, as well as dental work.  Your primary care clinic can assist you with arranging for these exams.  Remind your caregivers to forward copies of the records and final reports.    We want you to know that our program has physician and surgeon coverage 24 hours a day, 365 days a year. In addition, our transplant surgeons and physicians will not be on call for two or more transplant programs more than 30 miles apart unless the circumstances have been reviewed and approved by the [employer] (UN[employer]) Membership and Professional Standards Committee (MPSC). Finally, our primary physician and primary surgeons are not designated as the primary surgeon or primary physician at more than 1 transplant hospital. If this coverage changes or there are substantial program changes, you will be notified in writing by letter.     Attached is a letter from UN[employer] that describes the services and information offered to patients by UNOS and the Organ Procurement and Transplantation Network (OPTN).    We appreciate having had the opportunity to participate in your care.  If you have questions, please feel free to call the Transplant Office at [phone] or [phone].      Sincerely,   HOLA Rubio    Kidney Transplant Program    Enclosures: Telephone Contact List, UNOS Letter, Waitlist Information Update, and While You Are Waiting  CC:   Care Team                        The Organ Proc[facility]ment and Transplantation Network Toll-free patient services line:  1-739.794.7389  Your resource for organ transplant information    Staffed 8:30 am - 5:00 pm ET Monday - Friday Leave a message 24/7 to receive a call back    The Organ Procurement and Transplantation Network (OPTN) is the national transplant system. It makes the policies that decide how donated organs are matched to patients waiting  for a transplant. The OPTN:    Makes sure donated organs get matched to people on the transplant waiting list  Tells people about the donation and transplant processes  Makes sure that the public knows about the need for more organ and tissue donations    The OPTN has a free patient services line that you can call to:  Get more information about:  Organ donation and organ transplants  Donation and transplant policies  Get an information kit with:  A list of transplant hospitals  Waiting list information  Talk about any questions you may have about your transplant hospital or organ procurement organization. The staff will do their best to help you or point you to others who may help.  Find out how you can volunteer with the OPTN and help shape transplant policy     The patient services line number is: 6-562-980-2136    Patient services line staff CANNOT answer questions about your own medical care, including:  Waiting list status  Test results  Medical records  You will need to call your transplant hospital for this information.    The following websites have more information about transplantation and donation:    OPTN: https://optn.transplant.hrsa.gov/    For potential living donors and transplant recipients:    Living with transplant: https://www.transplantliving.org/    Living donation process: https://optn.transplant.hrsa.gov/living-donation/    Financial assistance: https://www.livingdonorassistance.org/    Transplantation data: https://www.srtr.org/    Organ donation: https://www.organdonor.gov/    Volunteer with the OPTN: https://optn.transplant.hrsa.gov/get-involved/

## 2023-08-31 NOTE — TELEPHONE ENCOUNTER
Called patient and discussed committee review results. Stated that he was approved for inactive status while he continues to complete his evaluation. Will be listed inactive status as of 8/31/23.    Recommended the following evaluation items:     -Cardiac Risk Screen and clearance   -Angio if approved donor  -Abdominal weight loss of 10 pounds  -Abdominal Pelvic CT  -Aorto-iliac Ultrasound    Orders placed, and letter will be sent. Reviewed education, see documentation only encounter.

## 2023-09-01 NOTE — TELEPHONE ENCOUNTER
RECORDS RECEIVED FROM:    DATE RECEIVED:    NOTES STATUS DETAILS   OFFICE NOTE from referring provider  Internal SOT   OFFICE NOTE from other cardiologists  N/A    RECORDS from hospital/ED N/A    MEDICATION LIST Internal    GENERAL CARDIO RECORDS   (ALL APPOINTMENT TYPES)     LABS (CBC,BMP,CMP, TSH) Internal    EKG (STRIPS & REPORTS) Internal 8-21-23   MONITORS (STRIPS & REPORTS) N/A    ECHOS (IMAGES AND REPORTS) Internal 8-21-23   STRESS TESTS (IMAGES AND REPORTS) In process 3-27-23 Requested   cMRI (IMAGES AND REPORTS) N/A    CT/CTA (IMAGES AND REPORTS) N/A      Action 9/1/23 [facility]  Fax #[phone]   Action Taken Requested:  Nuclear stress test  Stress echo 3-27-23  1-27-23     Tracking #[id number]    Received disc in mail, sent to  to be uploaded to PACS 9/6

## 2023-10-23 ENCOUNTER — ANCILLARY PROCEDURE (OUTPATIENT)
Dept: ULTRASOUND IMAGING | Facility: CLINIC | Age: 73
End: 2023-10-23
Attending: PHYSICIAN ASSISTANT
Payer: MEDICARE

## 2023-10-23 ENCOUNTER — ANCILLARY PROCEDURE (OUTPATIENT)
Dept: CT IMAGING | Facility: CLINIC | Age: 73
End: 2023-10-23
Attending: PHYSICIAN ASSISTANT
Payer: MEDICARE

## 2023-10-23 ENCOUNTER — OFFICE VISIT (OUTPATIENT)
Dept: CARDIOLOGY | Facility: CLINIC | Age: 73
End: 2023-10-23
Attending: PHYSICIAN ASSISTANT
Payer: MEDICARE

## 2023-10-23 ENCOUNTER — PRE VISIT (OUTPATIENT)
Dept: CARDIOLOGY | Facility: CLINIC | Age: 73
End: 2023-10-23

## 2023-10-23 VITALS — OXYGEN SATURATION: 98 % | HEART RATE: 64 BPM | DIASTOLIC BLOOD PRESSURE: 71 MMHG | SYSTOLIC BLOOD PRESSURE: 159 MMHG

## 2023-10-23 DIAGNOSIS — Z01.818 PRE-TRANSPLANT EVALUATION FOR KIDNEY TRANSPLANT: ICD-10-CM

## 2023-10-23 DIAGNOSIS — I10 ESSENTIAL HYPERTENSION: ICD-10-CM

## 2023-10-23 DIAGNOSIS — E11.22 TYPE 2 DIABETES MELLITUS WITH STAGE 5 CHRONIC KIDNEY DISEASE NOT ON CHRONIC DIALYSIS, WITH LONG-TERM CURRENT USE OF INSULIN (H): ICD-10-CM

## 2023-10-23 DIAGNOSIS — Z76.82 ORGAN TRANSPLANT CANDIDATE: ICD-10-CM

## 2023-10-23 DIAGNOSIS — N18.4 CHRONIC KIDNEY DISEASE, STAGE IV (SEVERE) (H): ICD-10-CM

## 2023-10-23 DIAGNOSIS — E11.9 DIABETES MELLITUS, TYPE 2 (H): ICD-10-CM

## 2023-10-23 DIAGNOSIS — Z79.4 TYPE 2 DIABETES MELLITUS WITH STAGE 5 CHRONIC KIDNEY DISEASE NOT ON CHRONIC DIALYSIS, WITH LONG-TERM CURRENT USE OF INSULIN (H): ICD-10-CM

## 2023-10-23 DIAGNOSIS — N18.5 TYPE 2 DIABETES MELLITUS WITH STAGE 5 CHRONIC KIDNEY DISEASE NOT ON CHRONIC DIALYSIS, WITH LONG-TERM CURRENT USE OF INSULIN (H): ICD-10-CM

## 2023-10-23 PROCEDURE — G1010 CDSM STANSON: HCPCS | Mod: GC | Performed by: RADIOLOGY

## 2023-10-23 PROCEDURE — 93978 VASCULAR STUDY: CPT | Mod: GC | Performed by: RADIOLOGY

## 2023-10-23 PROCEDURE — 99204 OFFICE O/P NEW MOD 45 MIN: CPT | Performed by: INTERNAL MEDICINE

## 2023-10-23 PROCEDURE — G0463 HOSPITAL OUTPT CLINIC VISIT: HCPCS | Performed by: INTERNAL MEDICINE

## 2023-10-23 PROCEDURE — 74176 CT ABD & PELVIS W/O CONTRAST: CPT | Mod: MG | Performed by: RADIOLOGY

## 2023-10-23 NOTE — PROGRESS NOTES
HPI:    Nael Silveira is a 73-year-old man seen today for pre-operative cardiovascular visit as part of a renal transplant evaluation.     He has no prior history of CAD, MI, or HF.    His cardiac risk factors include CKD since 2016, hypertension, diabetes mellitus type 2, and a remote history of smoking, having quit 25 years ago.    He has not had a coronary angiography before. He had a transthoracic echocardiogram on 08/21/2023, stress echocardiogram on 01/27/2023, and nuclear stress test on 03/27/2023 that was negative for inducible ischemia.    From a symptom perspective, he denies chest pain, dyspnea, palpitations, orthopnea, PND, pedal edema, syncope, or near-syncope. He notes having some fatigue related to his CKD.      PAST MEDICAL HISTORY:  Past Medical History:   Diagnosis Date    Anemia of chronic renal failure     Diabetes mellitus, type 2 (H)     diagnosed at age 46 years old, started insulin at about age 64    Hyperlipidemia     Hypertension     Secondary renal hyperparathyroidism (H24)        CURRENT MEDICATIONS:  Current Outpatient Medications   Medication Sig Dispense Refill    amLODIPine (NORVASC) 10 MG tablet TAKE 1 TABLET ONCE DAILY for 90      atorvastatin (LIPITOR) 20 MG tablet TAKE 1 TABLET ONCE DAILY for 90      BD PEN NEEDLE COLTON 2ND GEN 32G X 4 MM miscellaneous       calcitRIOL (ROCALTROL) 0.25 MCG capsule 1 capsule      FLUoxetine (PROZAC) 20 MG capsule TAKE 1 CAPSULE ONCE DAILY for 90      hydrALAZINE (APRESOLINE) 100 MG tablet Take 100 mg by mouth      hydrochlorothiazide (HYDRODIURIL) 12.5 MG tablet TAKE 1 TABLET ONCE DAILY for 90      insulin glargine (BASAGLAR KWIKPEN) 100 UNIT/ML pen 50 units Subcutaneous before bed      isosorbide mononitrate (IMDUR) 60 MG 24 hr tablet TAKE 1 TABLET ONCE DAILY for 90      losartan (COZAAR) 25 MG tablet 0.5 tablet Orally once a day      NOVOLOG FLEXPEN 100 UNIT/ML soln Take 8 units - 3x/day      study - aspirin, IDS# [id number], 81 mg tablet Take 81 mg  by mouth      tamsulosin (FLOMAX) 0.4 MG capsule Take 1 capsule by mouth daily      TRULICITY 1.5 MG/0.5ML pen Take 1.5mg injection - 1x/week for 84      Vitamin D3 (CHOLECALCIFEROL) 25 mcg (1000 units) tablet 1 tablet Orally Once a day for 30 day(s)         PAST SURGICAL HISTORY:  Past Surgical History:   Procedure Laterality Date    CATARACT EXTRACTION Left     CATARACT EXTRACTION Right     RELEASE CARPAL TUNNEL         ALLERGIES   No Known Allergies    FAMILY HISTORY:  No family history on file.    SOCIAL HISTORY:  Social History     Socioeconomic History    Marital status:      Spouse name: None    Number of children: None    Years of education: None    Highest education level: None   Tobacco Use    Smoking status: Former     Packs/day: 1     Types: Cigarettes     Start date:      Quit date:      Years since quittin.8    Smokeless tobacco: Never   Substance and Sexual Activity    Alcohol use: Yes     Comment: Socially    Drug use: Never       ROS:   Constitutional: No fever, chills, or sweats. No weight gain/loss   ENT: No visual disturbance, ear ache, epistaxis, sore throat  Allergies/Immunologic: Negative.   Respiratory: No cough, hemoptysia  Cardiovascular: As per HPI  GI: No nausea, vomiting, hematemesis, melena, or hematochezia  : No urinary frequency, dysuria, or hematuria  Integument: Negative  Psychiatric: Negative  Neuro: Negative  Endocrinology: Negative   Musculoskeletal: Negative    EXAM:  BP (!) 159/71 (BP Location: Left arm, Patient Position: Sitting, Cuff Size: Adult Regular)   Pulse 64   SpO2 98%   In general, the patient is a pleasant male in no apparent distress.    HEENT: NC/AT.  PERRLA.  EOMI.  Sclerae white, not injected.  Nares clear.  Neck: No jugular venous distension.   Heart: RRR. Normal S1, S2 splits physiologically. No murmur, rub, click, or gallop.  Lungs: CTA.  No ronchi, wheezes, rales.  No dullness to percussion.   Abdomen: Soft, nontender, nondistended. No  organomegaly.  No bruits.   Extremities: No clubbing, cyanosis, or edema.   Neurologic: Alert and oriented to person/place/time, normal speech, gait and affect  Skin: No petechiae, purpura or rash.    Labs:    LIVER ENZYME RESULTS:  Lab Results   Component Value Date    AST 21 2023    ALT 21 2023       CBC RESULTS:  Lab Results   Component Value Date    WBC 4.3 2023    RBC 3.61 (L) 2023    HGB 11.0 (L) 2023    HCT 31.9 (L) 2023    MCV 88 2023    MCH 30.5 2023    MCHC 34.5 2023    RDW 12.4 2023     (L) 2023       BMP RESULTS:  Lab Results   Component Value Date     2023    POTASSIUM 3.9 2023    CHLORIDE 107 2023    CO2 25 2023    ANIONGAP 10 2023     (H) 2023    BUN 52.0 (H) 2023    CR 3.34 (H) 2023    GFRESTIMATED 19 (L) 2023    MELIDA 9.1 2023        A1C RESULTS:  Lab Results   Component Value Date    A1C 6.4 (H) 2023       INR RESULTS:  Lab Results   Component Value Date    INR 0.99 2023       Procedures:    Transthoracic echocardiogram:    [facility],[place]  Echocardiography Laboratory  [address] [place] [id number]     Name: MORAIMA COTA  MRN: [id number]  : 1950  Study Date: 2023 01:19 PM  Age: 73 yrs  Gender: Male  Patient Location: [facility]  Reason For Study: Chronic kidney disease, stage 4, severely decreased GFR (H),  Hyp  Ordering Physician: JESSICA BERMUDEZ  Referring Physician: JESSICA BERMUDEZ  Performed By: Rut Stauffer     BSA: 2.3 m2  Height: 70 in  Weight: 243 lb  BP: 162/68 mmHg  ______________________________________________________________________________  Procedure  Echocardiogram with two-dimensional, color and spectral Doppler performed.  Technically difficult study.  ______________________________________________________________________________  Interpretation  Summary  Global and regional left ventricular function is normal with an EF of 60-65%.  Global right ventricular function is normal.  No significant valvular abnormalities present.  Mildly dilated ascending aorta 4.0 cm (1.73 cm/m2)  IVC diameter <2.1 cm collapsing >50% with sniff suggests a normal RA pressure  of 3 mmHg.  No pericardial effusion is present.  ______________________________________________________________________________  Left Ventricle  Global and regional left ventricular function is normal with an EF of 60-65%.  Left ventricular size is normal. Left ventricular wall thickness is normal.  Left ventricular diastolic function is indeterminate.     Right Ventricle  Global right ventricular function is normal. The right ventricle is normal  size.     Atria  Both atria appear normal.     Mitral Valve  The mitral valve is normal. Trace mitral insufficiency is present.     Aortic Valve  Aortic valve is normal in structure and function.     Tricuspid Valve  The tricuspid valve is normal. The peak velocity of the tricuspid regurgitant  jet is not obtainable.     Pulmonic Valve  On Doppler interrogation, there is no significant stenosis or regurgitation.     Vessels  The aorta root is normal. mildly dilated ascending aorta 4cm (1.73cm/m2). IVC  diameter <2.1 cm collapsing >50% with sniff suggests a normal RA pressure of 3  mmHg.     Pericardium  No pericardial effusion is present.     Miscellaneous  No significant valvular abnormalities present.     Compared to Previous Study  There is no prior study for direct comparison.     Attestation  I have personally viewed the imaging and agree with the interpretation and  report as documented by the fellow, Tor Ramirez, and/or edited by me.  ______________________________________________________________________________  MMode/2D Measurements & Calculations  IVSd: 1.0 cm  LVIDd: 5.4 cm  LVIDs: 3.4 cm  LVPWd: 1.0 cm  FS: 36.8 %  LV mass(C)d: 214.3 grams  LV  mass(C)dI: 94.5 grams/m2  Ao root diam: 3.4 cm  asc Aorta Diam: 4.0 cm  LVOT diam: 2.2 cm  LVOT area: 3.8 cm2  LA Volume (BP): 53.3 ml     LA Volume Index (BP): 23.5 ml/m2  RWT: 0.38  TAPSE: 2.6 cm     Doppler Measurements & Calculations  MV E max tramaine: 98.9 cm/sec  MV A max tramaine: 127.0 cm/sec  MV E/A: 0.78  MV dec slope: 383.0 cm/sec2  MV dec time: 0.26 sec  Ao V2 max: 194.0 cm/sec  Ao max PG: 15.1 mmHg  Ao V2 mean: 135.0 cm/sec  Ao mean P.0 mmHg  Ao V2 VTI: 42.9 cm  PRETTY(I,D): 2.4 cm2  PRETTY(V,D): 2.1 cm2  LV V1 max P.5 mmHg  LV V1 max: 106.0 cm/sec  LV V1 VTI: 27.6 cm  SV(LVOT): 104.9 ml  SI(LVOT): 46.3 ml/m2  AV Tramaine Ratio (DI): 0.55  PRETTY Index (cm2/m2): 1.1     E/E' avg: 15.8  Lateral E/e': 13.8  Medial E/e': 17.8  RV S Tramaine: 23.9 cm/sec     ______________________________________________________________________________  Report approved by: MD Teja Sanchez on 2023 02:41 PM     Stress echocardiogram 2023:    Interpretation Summary    * STRESS ECHO.    * Stress Echo is non-diagnostic secondary to suboptimal maximum predicted  heart rate and image quality.    * Stress EKG is negative for ischemic changes.    * No chest pain noted.    * 6 MET and 82 % max predicted heart rate (MPHR) achieved.    * Average aerobic capacity.    * RESTING ECHO.    * The left ventricular systolic function is normal, estimated LVEF 60-65%.    * No prior study available for comparison.    Patient Info    Name:     Nael Silveira  Age:     72 years  :     1950  Gender:     Male  MRN:     [id number]  Accession #:     [id number]  Ht:     70 in  Wt:     230 lbs  BSA:     2.30 m2  HR:     60 bpm  Systolic BP:     130 mmHg  Diastolic BP:     70 mmHg  Heart Rhythm:     Normal sinus rhythm  Technical Quality:     Diagnostic quality  Exam Date/Time:     2023 10:27 AM  Primary Loc:     [facility]  Patient Status:     Referral  Exam Type:     STRESS ECHO W/ CONTRAST    Study Info  Indications      OH (dyspnea  on exertion) -  Procedure    Exercise stress echocardiogram is performed with contrast.  Comments    Contrast agent was administered to enhance endocardial definition.    Procedure Details  Contrast/Agitated Saline    ------------------------------  Agent:     Definity  Echo Contrast Reaction:     None    Staff  Ordering Physician:     Gagan Spear  Sonographer:     Addis PHELPS RDCS, RVT    Reading Physician:     Freddie Bansal MD    Primary Care Physician:     Gagan Spear    Stress Personnel:     KRISTI Pcae      Stress Echo Findings    Stress Echo is non-diagnostic secondary to suboptimal maximum predicted  heart rate and image quality. Stress EKG is negative for ischemic changes. No  chest pain noted. 6 MET and 82 % max predicted heart rate (MPHR) achieved.  Average aerobic capacity. Normal blood pressure response to exercise.      Rest Echo Findings  Left Ventricle    The left ventricle is normal size. There is moderate concentric left  ventricular hypertrophy. The left ventricular systolic function is normal,  estimated LVEF 60-65%. Left ventricular wall motion is grossly normal however,  endocardial definition is limited.  Right Ventricle    The right ventricle is not well visualized. Probably normal right  ventricular systolic function.      Left Atrium    The left atrium is borderline enlarged.    Right Atrium    The right atrium is normal.      Aortic Valve    The aortic valve is trileaflet. There is mild aortic valve sclerosis. There  is no aortic valve stenosis. There is no significant aortic regurgitation.    Mitral Valve    The mitral valve leaflets are mildly thickened. There is mild mitral  regurgitation.    Tricuspid Valve    The tricuspid valve leaflets are mildly thickened. There is no significant  tricuspid regurgitation. There is no significant tricuspid valve stenosis.      Pericardium/Pleura    There is no pericardial effusion.    Vessels    The aortic measurements are indexed to  age and body surface area. The aortic  root is normal in size measuring 3.5 cm. The proximal ascending aorta is  borderline dilated measuring 4.2 cm.      Protocol:     Robbin  Resting HR:     71 bpm  Peak HR:     122 bpm  Rest Sys BP:     138 mmHg  Peak Sys BP:     146 mmHg  Max Pred HR:     149 bpm  % Max Pred HR:     82 %  Target HR:     127 bpm  Max RPP:     17,812 bpm*mmHg  Adequacy of Test:     Target heart rate not achieved  Termination Reason:     Lightheadedness, Fatigue  Stress Symptoms:     Lightheadedness, Leg fatigue, Fatigue  Total Time:     3 min : 48 sec  Rest Avalos BP:     70 mmHg  Peak Avalos BP:     64 mmHg  Angina Score:     None  Total METS:     6    ------------------------------  Stage:     Rest  Duration (min):     0 min : 0 sec  Speed (mph):     0.0  Grade (%):     0  HR (bpm):     71  SBP (mmHg):     138  DBP (mmHg):     70  SPO2 (%):     97  METS:     1.0  Symptoms:     None    ------------------------------  Stage:     Exe 1  Duration (min):     3 min : 0 sec  Speed (mph):     1.7  Grade (%):     10  HR (bpm):     111  SBP (mmHg):     146  DBP (mmHg):     64  SPO2 (%):     95  METS:     4.6  Symptoms:     Leg fatigue    ------------------------------  Stage:     Exe 2  Duration (min):     3 min : 48 sec  Speed (mph):     2.5  Grade (%):     12  HR (bpm):     122  SBP (mmHg):     ---  DBP (mmHg):     ---  METS:     5.6  Symptoms:     Leg fatigue,  Fatigue and Lightheadedness    ------------------------------  Stage:     Rec 1  Duration (min):     1 min : 0 sec  Speed (mph):     0.0  Grade (%):     0  HR (bpm):     117  SBP (mmHg):     ---  DBP (mmHg):     ---  METS:     1.0  Symptoms:     None    ------------------------------  Stage:     Rec 2  Duration (min):     3 min : 0 sec  Speed (mph):     0.0  Grade (%):     0  HR (bpm):     102  SBP (mmHg):     140  DBP (mmHg):     64  METS:     1.0  Symptoms:     None    ------------------------------  Stage:     Rec 3  Duration (min):     5 min : 0  sec  Speed (mph):     0.0  Grade (%):     0  HR (bpm):     92  SBP (mmHg):     156  DBP (mmHg):     70  METS:     1.0  Symptoms:     None    ------------------------------  Stage:     Rec 5  Duration (min):     9 min : 0 sec  Speed (mph):     0.0  Grade (%):     0  HR (bpm):     90  SBP (mmHg):     142  DBP (mmHg):     62  METS:     1.0  Symptoms:     None    Effort Tolerance:     Moderately decreased exercise capacity      History/Risk Factors  History/Risk Factors  Hypertension:     Yes  Dyslipidemia:     Yes  Diabetes Mellitus:     Yes  Sedentary:     Yes  Tobacco Use:     Former      Heart/Lung Sounds    No murmur, CTA.      Prior Interventions  Pacemaker:     No      Medications Held    ------------------------------  Name:     Amlodipine  Dosage:     10 mg  Frequency:     daily  Held:     Yes    ------------------------------  Name:     Isosorbide  Dosage:     60 mg  Frequency:     daily  Held:     Yes      Left Ventricular Outflow Tract  ----------------------------------------------------------------------  Name                                 Value        Normal  ----------------------------------------------------------------------    LVOT Doppler  ----------------------------------------------------------------------  LVOT Peak Velocity                 1 m/sec                LVOT Peak Gradient                  4 mmHg                LVOT Mean Gradient                  2 mmHg                LVOT VTI                           19.4 cm                LVOT VTI/AV VTI Ratio                  0.6    Aorta  ----------------------------------------------------------------------  Name                                 Value        Normal  ----------------------------------------------------------------------    Ascending Aorta  ----------------------------------------------------------------------  Sinus of Valsalva Diameter          3.5 cm       3.1-3.7  Prox Asc Ao Diameter                4.2 cm        2.6-3.4    Aortic Valve  ----------------------------------------------------------------------  Name                                 Value        Normal  ----------------------------------------------------------------------    AV Doppler  ----------------------------------------------------------------------  AV Peak Velocity                   1.8 m/s                AV Peak Gradient                   13 mmHg                AV Mean Gradient                    6 mmHg           <10  AV VTI                             34.6 cm                AV V1/V2 Ratio                        0.58    Ventricles  ----------------------------------------------------------------------  Name                                 Value        Normal  ----------------------------------------------------------------------    LV Dimensions 2D/MM  ----------------------------------------------------------------------   IVS Diastolic Thickness  (2D)                                1.4 cm          <1.0  LVID Diastole (2D)                  5.0 cm         <=5.5   LVPW Diastolic Thickness  (2D)                                1.3 cm       0.6-1.0  LVID Systole (2D)                   4.0 cm       2.5-4.0    LV Fractional Shortening/Ejection Fraction 2D/MM  ----------------------------------------------------------------------   LV Fractional Shortening  (2D)                                  20 %         25-43  LV EF (2D Teicholz)                   41 %         52-72    RV Dimensions 2D/MM  ----------------------------------------------------------------------   RV Basal Diastolic  Dimension                           3.8 cm       2.5-4.1   RV Mid-Cavity Diastolic  Dimension                           2.9 cm       1.9-3.5    Atria  ----------------------------------------------------------------------  Name                                 Value        Normal  ----------------------------------------------------------------------    LA  Dimensions  ----------------------------------------------------------------------  LA Dimension (2D)                   4.2 cm       3.0-4.1  LA Volume (4C A-L)                   46 ml                LA Volume (BP A-L)                   46 ml                  RA Dimensions  ----------------------------------------------------------------------  RA Area (4C)                      12.2 cm2        <=18.0      Report Signatures  Stress ECG Finalized by Freddie Bansal MD on 2023 12:25 PM  Echo Finalized by Freddie Bansal MD on 2023 12:25 PM    Procedure Note    Freddie Bansal MD - 2023  Formatting of this note might be different from the original.  Interpretation Summary    * STRESS ECHO.    * Stress Echo is non-diagnostic secondary to suboptimal maximum predicted  heart rate and image quality.    * Stress EKG is negative for ischemic changes.    * No chest pain noted.    * 6 MET and 82 % max predicted heart rate (MPHR) achieved.    * Average aerobic capacity.    * RESTING ECHO.    * The left ventricular systolic function is normal, estimated LVEF 60-65%.    * No prior study available for comparison.      Patient Info  Name:     Nael Silveira  Age:     72 years  :     1950  Gender:     Male  MRN:     [id number]  Accession #:     [id number]  Ht:     70 in  Wt:     230 lbs  BSA:     2.30 m2  HR:     60 bpm  Systolic BP:     130 mmHg  Diastolic BP:     70 mmHg  Heart Rhythm:     Normal sinus rhythm  Technical Quality:     Diagnostic quality  Exam Date/Time:     2023 10:27 AM  Primary Loc:     [facility]  Patient Status:     Referral  Exam Type:     STRESS ECHO W/ CONTRAST    Study Info  Indications      OH (dyspnea on exertion) -  Procedure    Exercise stress echocardiogram is performed with contrast.  Comments    Contrast agent was administered to enhance endocardial definition.    Procedure Details  Contrast/Agitated Saline    ------------------------------  Agent:     Definity  Echo Contrast Reaction:      None    Staff  Ordering Physician:     Gagan Spear  Sonographer:     Addis PHELPS RDCS, RVT    Reading Physician:     Freddie Bansal MD    Primary Care Physician:     Gagan Spear    Stress Personnel:     KRISTI Pace      Stress Echo Findings    Stress Echo is non-diagnostic secondary to suboptimal maximum predicted  heart rate and image quality. Stress EKG is negative for ischemic changes. No  chest pain noted. 6 MET and 82 % max predicted heart rate (MPHR) achieved.  Average aerobic capacity. Normal blood pressure response to exercise.      Rest Echo Findings  Left Ventricle    The left ventricle is normal size. There is moderate concentric left  ventricular hypertrophy. The left ventricular systolic function is normal,  estimated LVEF 60-65%. Left ventricular wall motion is grossly normal however,  endocardial definition is limited.  Right Ventricle    The right ventricle is not well visualized. Probably normal right  ventricular systolic function.      Left Atrium    The left atrium is borderline enlarged.    Right Atrium    The right atrium is normal.      Aortic Valve    The aortic valve is trileaflet. There is mild aortic valve sclerosis. There  is no aortic valve stenosis. There is no significant aortic regurgitation.    Mitral Valve    The mitral valve leaflets are mildly thickened. There is mild mitral  regurgitation.    Tricuspid Valve    The tricuspid valve leaflets are mildly thickened. There is no significant  tricuspid regurgitation. There is no significant tricuspid valve stenosis.      Pericardium/Pleura    There is no pericardial effusion.    Vessels    The aortic measurements are indexed to age and body surface area. The aortic  root is normal in size measuring 3.5 cm. The proximal ascending aorta is  borderline dilated measuring 4.2 cm.      Protocol:     Robbin  Resting HR:     71 bpm  Peak HR:     122 bpm  Rest Sys BP:     138 mmHg  Peak Sys BP:     146 mmHg  Max Pred HR:     149  bpm  % Max Pred HR:     82 %  Target HR:     127 bpm  Max RPP:     17,812 bpm*mmHg  Adequacy of Test:     Target heart rate not achieved  Termination Reason:     Lightheadedness, Fatigue  Stress Symptoms:     Lightheadedness, Leg fatigue, Fatigue  Total Time:     3 min : 48 sec  Rest Avalos BP:     70 mmHg  Peak Avalos BP:     64 mmHg  Angina Score:     None  Total METS:     6    ------------------------------  Stage:     Rest  Duration (min):     0 min : 0 sec  Speed (mph):     0.0  Grade (%):     0  HR (bpm):     71  SBP (mmHg):     138  DBP (mmHg):     70  SPO2 (%):     97  METS:     1.0  Symptoms:     None    ------------------------------  Stage:     Exe 1  Duration (min):     3 min : 0 sec  Speed (mph):     1.7  Grade (%):     10  HR (bpm):     111  SBP (mmHg):     146  DBP (mmHg):     64  SPO2 (%):     95  METS:     4.6  Symptoms:     Leg fatigue    ------------------------------  Stage:     Exe 2  Duration (min):     3 min : 48 sec  Speed (mph):     2.5  Grade (%):     12  HR (bpm):     122  SBP (mmHg):     ---  DBP (mmHg):     ---  METS:     5.6  Symptoms:     Leg fatigue,  Fatigue and Lightheadedness    ------------------------------  Stage:     Rec 1  Duration (min):     1 min : 0 sec  Speed (mph):     0.0  Grade (%):     0  HR (bpm):     117  SBP (mmHg):     ---  DBP (mmHg):     ---  METS:     1.0  Symptoms:     None    ------------------------------  Stage:     Rec 2  Duration (min):     3 min : 0 sec  Speed (mph):     0.0  Grade (%):     0  HR (bpm):     102  SBP (mmHg):     140  DBP (mmHg):     64  METS:     1.0  Symptoms:     None    ------------------------------  Stage:     Rec 3  Duration (min):     5 min : 0 sec  Speed (mph):     0.0  Grade (%):     0  HR (bpm):     92  SBP (mmHg):     156  DBP (mmHg):     70  METS:     1.0  Symptoms:     None    ------------------------------  Stage:     Rec 5  Duration (min):     9 min : 0 sec  Speed (mph):     0.0  Grade (%):     0  HR (bpm):     90  SBP (mmHg):      142  DBP (mmHg):     62  METS:     1.0  Symptoms:     None    Effort Tolerance:     Moderately decreased exercise capacity      History/Risk Factors  History/Risk Factors  Hypertension:     Yes  Dyslipidemia:     Yes  Diabetes Mellitus:     Yes  Sedentary:     Yes  Tobacco Use:     Former      Heart/Lung Sounds    No murmur, CTA.      Prior Interventions  Pacemaker:     No      Medications Held    ------------------------------  Name:     Amlodipine  Dosage:     10 mg  Frequency:     daily  Held:     Yes    ------------------------------  Name:     Isosorbide  Dosage:     60 mg  Frequency:     daily  Held:     Yes      Left Ventricular Outflow Tract  ----------------------------------------------------------------------  Name                                 Value        Normal  ----------------------------------------------------------------------    LVOT Doppler  ----------------------------------------------------------------------  LVOT Peak Velocity                 1 m/sec                LVOT Peak Gradient                  4 mmHg                LVOT Mean Gradient                  2 mmHg                LVOT VTI                           19.4 cm                LVOT VTI/AV VTI Ratio                  0.6    Aorta  ----------------------------------------------------------------------  Name                                 Value        Normal  ----------------------------------------------------------------------    Ascending Aorta  ----------------------------------------------------------------------  Sinus of Valsalva Diameter          3.5 cm       3.1-3.7  Prox Asc Ao Diameter                4.2 cm       2.6-3.4    Aortic Valve  ----------------------------------------------------------------------  Name                                 Value        Normal  ----------------------------------------------------------------------    AV  Doppler  ----------------------------------------------------------------------  AV Peak Velocity                   1.8 m/s                AV Peak Gradient                   13 mmHg                AV Mean Gradient                    6 mmHg           <10  AV VTI                             34.6 cm                AV V1/V2 Ratio                        0.58    Ventricles  ----------------------------------------------------------------------  Name                                 Value        Normal  ----------------------------------------------------------------------    LV Dimensions 2D/MM  ----------------------------------------------------------------------   IVS Diastolic Thickness  (2D)                                1.4 cm          <1.0  LVID Diastole (2D)                  5.0 cm         <=5.5   LVPW Diastolic Thickness  (2D)                                1.3 cm       0.6-1.0  LVID Systole (2D)                   4.0 cm       2.5-4.0    LV Fractional Shortening/Ejection Fraction 2D/MM  ----------------------------------------------------------------------   LV Fractional Shortening  (2D)                                  20 %         25-43  LV EF (2D Teicholz)                   41 %         52-72    RV Dimensions 2D/MM  ----------------------------------------------------------------------   RV Basal Diastolic  Dimension                           3.8 cm       2.5-4.1   RV Mid-Cavity Diastolic  Dimension                           2.9 cm       1.9-3.5    Atria  ----------------------------------------------------------------------  Name                                 Value        Normal  ----------------------------------------------------------------------    LA Dimensions  ----------------------------------------------------------------------  LA Dimension (2D)                   4.2 cm       3.0-4.1  LA Volume (4C A-L)                   46 ml                LA Volume (BP A-L)                   46 ml                   RA Dimensions  ----------------------------------------------------------------------  RA Area (4C)                      12.2 cm2        <=18.0      Report Signatures  Stress ECG Finalized by Freddie Bansal MD on 2023 12:25 PM  Echo Finalized by Freddie Bansal MD on 2023 12:25 PM      Nuclear stress test 2023:    Summary    1. Pharmacologic nuclear stress test is negative for inducible ischemia.    2. Stress EKG is negative for ischemia.    3. Post stress left ventricular ejection fraction is normal, estimated LVEF,  62 %.    4. No prior study .    Patient Info  Name:     Nael Silveira  Age:     72 years  :     1950  Gender:     Male  MRN:     [id number]  Accession #:     [id number]  Ht:     69 in  Wt:     235 lb  BMI:     34.70  HR:     67 bpm  BP:     142  /     78 mmHg  Technical Quality:     Diagnostic quality  Exam Date:     3/27/2023 9:30 AM  Patient Status:     Outpatient  Admit Date:     3/27/2023  Primary Loc:     [facility]    Any Known Allergies:     NKDA    Study Info  Indications      Pre-operative clearance -      Shortness of breath -      Evaluation of myocardial perfusion -    Exam Type:     NM STRESS TEST    Upright Gated Rest SPECT images and Upright Gated Stress SPECT images obtained  post sestamibi injection. Supine Stress SPECT images obtained.    Staff  Ordering Physician:     Gagan Spear  Primary Care Physician:     Gagan Spear  Nuclear Technologist:     Velvet KELSEY, RT, CT, ARRT (N)  Stress Personnel:     Leandro BERRY      History/Risk Factors  History/Risk Factors  Hypertension:     Yes  Dyslipidemia:     Yes  Diabetes Mellitus:     Yes  Family History of CAD:     No  Sedentary:     Yes  Tobacco Use:     Former      Heart/Lung Sounds    No murmur, CTA.      Prior Interventions  Pacemaker:     No      Medications Held    ------------------------------  Name:     Amlodipine  Dosage:     10 mg  Frequency:     daily  Held:      Yes    ------------------------------  Name:     Imdur  Dosage:     60 mg  Frequency:     daily  Held:     Yes      Stress ECG Details  Protocol:     Regadenoson  Rest HR:     67 bpm  Rest Sys BP:     142 mmHg  Max Pred HR:     149 bpm  Target HR:     127 bpm  Adequacy of Test:     Typical symptoms with regadenoson infusion. Probable  adequate vasodilation response.  Termination Reason:     Regadenoson protocol completed  Total Time:     1 min : 0 sec  Rest Avalos BP:     78 mmHg  Total Dose:     0.4 mg    ------------------------------  Stage:     Rest  Duration (min):     0 min : 0 sec  HR (bpm):     67  SBP (mmHg):     142  DBP (mmHg):     78  SPO2:     98  Symptoms:     None    ------------------------------  Stage:     Exe 1  Duration (min):     1 min : 0 sec  HR (bpm):     81  SBP (mmHg):     146  DBP (mmHg):     72  SPO2:     98  Symptoms:     Dyspnea: Mild    ------------------------------  Stage:     Rec 1  Duration (min):     1 min : 1 sec  HR (bpm):     84  SBP (mmHg):     138  DBP (mmHg):     72  SPO2:     98  Symptoms:     Dyspnea: Mild    ------------------------------  Stage:     Rec 2  Duration (min):     3 min : 1 sec  HR (bpm):     77  SBP (mmHg):     142  DBP (mmHg):     70  SPO2:     98  Symptoms:     None    ------------------------------  Stage:     Rec 3  Duration (min):     5 min : 1 sec  HR (bpm):     75  SBP (mmHg):     136  DBP (mmHg):     72  SPO2:     98  Symptoms:     None  Comments:     PAC    Post Vasodilator Lowest HR:     75 bpm    Lowest Sys BP:     138 mmHg    Lowest Avalos BP:     70 mmHg    Stress Symptoms    Shortness of breath.    Effort Tolerance:     Not applicable.    Route of Administration:     Intravenous    Duration of Administration:     10 seconds      Radiopharmaceutical:     Tc-99m Sestamibi    Route of Administration:     Intravenous    Radiopharmaceutical:     Tc-99m Sestamibi    Route of Administration:     Intravenous    Image Protocol  Protocol:     Rest/Stress 1  Day  Rest  Dose:     10.1 mCi  Stress  Dose:     30.4 mCi      SPECT Results  Perfusion Findings  Post stress left ventricular ejection fraction is normal, estimated LVEF, 62  %. Transient Ischemic Dilatation of 1.07 (normal). Lung to heart ratio (LHR)  uptake is 0.30 (normal).  Summed Difference Score:     0  Summed Stress Score:     0  Summed Rest Score:     2    Motion Correction:     No motion correction artifact. Motion correction not  applied.      Functional Results  ----------------------------------------------------------------------  Name                                 Value        Normal  ----------------------------------------------------------------------    Stress  ----------------------------------------------------------------------  Stress LV Ejection Fraction           62 %         55-70   Stress LV End Systolic  Volume                               43 ml                 Nuclear Stress Cardiac  Output                           4.2 l/min                 Stress LV End Diastolic  Volume                              113 ml                 Transient Ischemic  Dilatation                            1.07                 Nuclear Stress Myocardial  Mass                                 134 g  Functional Results  ----------------------------------------------------------------------  Name                                 Value        Normal  ----------------------------------------------------------------------    Rest  ----------------------------------------------------------------------   Resting LV Ejection  Fraction                              58 %         55-70   Resting LV End Systolic  Volume                               43 ml                 Nuclear Rest Myocardial  Mass                                 132 g                 Resting LV End Diastolic  Volume                              103 ml                Nuclear Rest Cardiac Output      3.6 l/min  Functional Findings    Stress ejection fraction  is 62 % and Rest ejection fraction is 58 % -  normal.      LHR:     0.30      Report Signatures  MPI SPECT Finalized by Jose E Starks MD on 2023 11:55 AM  Stress Finalized by Jose E Starks MD on 2023 11:55 AM    Procedure Note    Jose E Starks MD - 2023  Formatting of this note might be different from the original.  Summary    1. Pharmacologic nuclear stress test is negative for inducible ischemia.    2. Stress EKG is negative for ischemia.    3. Post stress left ventricular ejection fraction is normal, estimated LVEF,  62 %.    4. No prior study .      Patient Info  Name:     Nael Silveira  Age:     72 years  :     1950  Gender:     Male  MRN:     [id number]  Accession #:     [id number]  Ht:     69 in  Wt:     235 lb  BMI:     34.70  HR:     67 bpm  BP:     142  /     78 mmHg  Technical Quality:     Diagnostic quality  Exam Date:     3/27/2023 9:30 AM  Patient Status:     Outpatient  Admit Date:     3/27/2023  Primary Loc:     [facility]    Any Known Allergies:     NKDA    Study Info  Indications      Pre-operative clearance -      Shortness of breath -      Evaluation of myocardial perfusion -    Exam Type:     NM STRESS TEST    Upright Gated Rest SPECT images and Upright Gated Stress SPECT images obtained  post sestamibi injection. Supine Stress SPECT images obtained.    Staff  Ordering Physician:     Gagan Spear  Primary Care Physician:     Gagan Spear  Nuclear Technologist:     Velvet HURLEY CNMT, RT, CT, ARRT (N)  Stress Personnel:     Leandro BERRY      History/Risk Factors  History/Risk Factors  Hypertension:     Yes  Dyslipidemia:     Yes  Diabetes Mellitus:     Yes  Family History of CAD:     No  Sedentary:     Yes  Tobacco Use:     Former      Heart/Lung Sounds    No murmur, CTA.      Prior Interventions  Pacemaker:     No      Medications Held    ------------------------------  Name:     Amlodipine  Dosage:     10 mg  Frequency:     daily  Held:      is 62 % and Rest ejection fraction is 58 % -  normal.    LHR:     0.30    Report Signatures  MPI SPECT Finalized by Jose E Starks MD on 03/27/2023 11:55 AM  Stress Finalized by Jose E Starks MD on 03/27/2023 11:55 AM    Assessment and Plan:    Nael Silveira is a 73-year-old man who was seen for a cardiology evaluation prior to a renal transplant surgery. He has no cardiac history and is asymptomatic from a cardiac perspective. His cardiac risk factors place him at an intermediate risk for CAD, for which he was evaluated with stress testing that showed no evidence of ischemia or prior MI. Therefore, he is deemed to be at an acceptable risk to proceed to renal transplant surgery. He does not need any further cardiac testing.    Aleksandr Pearson MD  Cardiology

## 2023-10-23 NOTE — PATIENT INSTRUCTIONS
Patient Instructions:  It was a pleasure to see you in the cardiology clinic today.      If you have any questions, you can reach my nurse, Molly Harvey RN, at [phone].  Press Option #1 for the [facility], and then press Option #4 for nursing.    We are encouraging the use of [employer] to communicate with your HealthCare Provider      Recommendations/plan: No changes made at this time. Testing previously completed      Please follow up: With Dr. Pearson as needed     Sincerely,    Aleksandr Pearson MD     If you have an urgent need after hours (8:00 am to 4:30 pm) please call [phone] and ask for the cardiology fellow on call.

## 2023-10-23 NOTE — LETTER
10/23/2023      RE: Nael Silveira  [address]  [place] [address]       Dear Colleague,    Thank you for the opportunity to participate in the care of your patient, Nael Silveira, at the [facility] [place] at [facility]. Please see a copy of my visit note below.    HPI:    Nael Silveira is a 73-year-old man seen today for pre-operative cardiovascular visit as part of a renal transplant evaluation.     He has no prior history of CAD, MI, or HF.    His cardiac risk factors include CKD since 2016, hypertension, diabetes mellitus type 2, and a remote history of smoking, having quit 25 years ago.    He has not had a coronary angiography before. He had a transthoracic echocardiogram on 08/21/2023, stress echocardiogram on 01/27/2023, and nuclear stress test on 03/27/2023 that was negative for inducible ischemia.    From a symptom perspective, he denies chest pain, dyspnea, palpitations, orthopnea, PND, pedal edema, syncope, or near-syncope. He notes having some fatigue related to his CKD.      PAST MEDICAL HISTORY:  Past Medical History:   Diagnosis Date    Anemia of chronic renal failure     Diabetes mellitus, type 2 (H)     diagnosed at age 46 years old, started insulin at about age 64    Hyperlipidemia     Hypertension     Secondary renal hyperparathyroidism (H24)        CURRENT MEDICATIONS:  Current Outpatient Medications   Medication Sig Dispense Refill    amLODIPine (NORVASC) 10 MG tablet TAKE 1 TABLET ONCE DAILY for 90      atorvastatin (LIPITOR) 20 MG tablet TAKE 1 TABLET ONCE DAILY for 90      BD PEN NEEDLE COLTON 2ND GEN 32G X 4 MM miscellaneous       calcitRIOL (ROCALTROL) 0.25 MCG capsule 1 capsule      FLUoxetine (PROZAC) 20 MG capsule TAKE 1 CAPSULE ONCE DAILY for 90      hydrALAZINE (APRESOLINE) 100 MG tablet Take 100 mg by mouth      hydrochlorothiazide (HYDRODIURIL) 12.5 MG tablet TAKE 1 TABLET ONCE DAILY for 90       insulin glargine (BASAGLAR KWIKPEN) 100 UNIT/ML pen 50 units Subcutaneous before bed      isosorbide mononitrate (IMDUR) 60 MG 24 hr tablet TAKE 1 TABLET ONCE DAILY for 90      losartan (COZAAR) 25 MG tablet 0.5 tablet Orally once a day      NOVOLOG FLEXPEN 100 UNIT/ML soln Take 8 units - 3x/day      study - aspirin, IDS# [id number], 81 mg tablet Take 81 mg by mouth      tamsulosin (FLOMAX) 0.4 MG capsule Take 1 capsule by mouth daily      TRULICITY 1.5 MG/0.5ML pen Take 1.5mg injection - 1x/week for 84      Vitamin D3 (CHOLECALCIFEROL) 25 mcg (1000 units) tablet 1 tablet Orally Once a day for 30 day(s)         PAST SURGICAL HISTORY:  Past Surgical History:   Procedure Laterality Date    CATARACT EXTRACTION Left     CATARACT EXTRACTION Right     RELEASE CARPAL TUNNEL         ALLERGIES   No Known Allergies    FAMILY HISTORY:  No family history on file.    SOCIAL HISTORY:  Social History     Socioeconomic History    Marital status:      Spouse name: None    Number of children: None    Years of education: None    Highest education level: None   Tobacco Use    Smoking status: Former     Packs/day: 1     Types: Cigarettes     Start date:      Quit date:      Years since quittin.8    Smokeless tobacco: Never   Substance and Sexual Activity    Alcohol use: Yes     Comment: Socially    Drug use: Never       ROS:   Constitutional: No fever, chills, or sweats. No weight gain/loss   ENT: No visual disturbance, ear ache, epistaxis, sore throat  Allergies/Immunologic: Negative.   Respiratory: No cough, hemoptysia  Cardiovascular: As per HPI  GI: No nausea, vomiting, hematemesis, melena, or hematochezia  : No urinary frequency, dysuria, or hematuria  Integument: Negative  Psychiatric: Negative  Neuro: Negative  Endocrinology: Negative   Musculoskeletal: Negative    EXAM:  BP (!) 159/71 (BP Location: Left arm, Patient Position: Sitting, Cuff Size: Adult Regular)   Pulse 64   SpO2 98%   In general, the patient is  a pleasant male in no apparent distress.    HEENT: NC/AT.  PERRLA.  EOMI.  Sclerae white, not injected.  Nares clear.  Neck: No jugular venous distension.   Heart: RRR. Normal S1, S2 splits physiologically. No murmur, rub, click, or gallop.  Lungs: CTA.  No ronchi, wheezes, rales.  No dullness to percussion.   Abdomen: Soft, nontender, nondistended. No organomegaly.  No bruits.   Extremities: No clubbing, cyanosis, or edema.   Neurologic: Alert and oriented to person/place/time, normal speech, gait and affect  Skin: No petechiae, purpura or rash.    Labs:    LIVER ENZYME RESULTS:  Lab Results   Component Value Date    AST 21 2023    ALT 21 2023       CBC RESULTS:  Lab Results   Component Value Date    WBC 4.3 2023    RBC 3.61 (L) 2023    HGB 11.0 (L) 2023    HCT 31.9 (L) 2023    MCV 88 2023    MCH 30.5 2023    MCHC 34.5 2023    RDW 12.4 2023     (L) 2023       BMP RESULTS:  Lab Results   Component Value Date     2023    POTASSIUM 3.9 2023    CHLORIDE 107 2023    CO2 25 2023    ANIONGAP 10 2023     (H) 2023    BUN 52.0 (H) 2023    CR 3.34 (H) 2023    GFRESTIMATED 19 (L) 2023    MELIDA 9.1 2023        A1C RESULTS:  Lab Results   Component Value Date    A1C 6.4 (H) 2023       INR RESULTS:  Lab Results   Component Value Date    INR 0.99 2023       Procedures:    Transthoracic echocardiogram:    [facility],[place]  Echocardiography Laboratory  [address] [place] [address]     Name: MORAIMA COTA  MRN: [id number]  : 1950  Study Date: 2023 01:19 PM  Age: 73 yrs  Gender: Male  Patient Location: [facility]  Reason For Study: Chronic kidney disease, stage 4, severely decreased GFR (H),  Hyp  Ordering Physician: JESSICA BERMUDEZ  Referring Physician: JESSICA BERMUDEZ  Performed By: Rut Stauffer     BSA: 2.3  m2  Height: 70 in  Weight: 243 lb  BP: 162/68 mmHg  ______________________________________________________________________________  Procedure  Echocardiogram with two-dimensional, color and spectral Doppler performed.  Technically difficult study.  ______________________________________________________________________________  Interpretation Summary  Global and regional left ventricular function is normal with an EF of 60-65%.  Global right ventricular function is normal.  No significant valvular abnormalities present.  Mildly dilated ascending aorta 4.0 cm (1.73 cm/m2)  IVC diameter <2.1 cm collapsing >50% with sniff suggests a normal RA pressure  of 3 mmHg.  No pericardial effusion is present.  ______________________________________________________________________________  Left Ventricle  Global and regional left ventricular function is normal with an EF of 60-65%.  Left ventricular size is normal. Left ventricular wall thickness is normal.  Left ventricular diastolic function is indeterminate.     Right Ventricle  Global right ventricular function is normal. The right ventricle is normal  size.     Atria  Both atria appear normal.     Mitral Valve  The mitral valve is normal. Trace mitral insufficiency is present.     Aortic Valve  Aortic valve is normal in structure and function.     Tricuspid Valve  The tricuspid valve is normal. The peak velocity of the tricuspid regurgitant  jet is not obtainable.     Pulmonic Valve  On Doppler interrogation, there is no significant stenosis or regurgitation.     Vessels  The aorta root is normal. mildly dilated ascending aorta 4cm (1.73cm/m2). IVC  diameter <2.1 cm collapsing >50% with sniff suggests a normal RA pressure of 3  mmHg.     Pericardium  No pericardial effusion is present.     Miscellaneous  No significant valvular abnormalities present.     Compared to Previous Study  There is no prior study for direct comparison.     Attestation  I have personally viewed the  imaging and agree with the interpretation and  report as documented by the fellow, Tor Ramirez, and/or edited by me.  ______________________________________________________________________________  MMode/2D Measurements & Calculations  IVSd: 1.0 cm  LVIDd: 5.4 cm  LVIDs: 3.4 cm  LVPWd: 1.0 cm  FS: 36.8 %  LV mass(C)d: 214.3 grams  LV mass(C)dI: 94.5 grams/m2  Ao root diam: 3.4 cm  asc Aorta Diam: 4.0 cm  LVOT diam: 2.2 cm  LVOT area: 3.8 cm2  LA Volume (BP): 53.3 ml     LA Volume Index (BP): 23.5 ml/m2  RWT: 0.38  TAPSE: 2.6 cm     Doppler Measurements & Calculations  MV E max tramaine: 98.9 cm/sec  MV A max tramaine: 127.0 cm/sec  MV E/A: 0.78  MV dec slope: 383.0 cm/sec2  MV dec time: 0.26 sec  Ao V2 max: 194.0 cm/sec  Ao max PG: 15.1 mmHg  Ao V2 mean: 135.0 cm/sec  Ao mean P.0 mmHg  Ao V2 VTI: 42.9 cm  PRETTY(I,D): 2.4 cm2  PRETTY(V,D): 2.1 cm2  LV V1 max P.5 mmHg  LV V1 max: 106.0 cm/sec  LV V1 VTI: 27.6 cm  SV(LVOT): 104.9 ml  SI(LVOT): 46.3 ml/m2  AV Tramaine Ratio (DI): 0.55  PRETTY Index (cm2/m2): 1.1     E/E' avg: 15.8  Lateral E/e': 13.8  Medial E/e': 17.8  RV S Tramaine: 23.9 cm/sec     ______________________________________________________________________________  Report approved by: MD Teja Sanchez on 2023 02:41 PM     Stress echocardiogram 2023:    Interpretation Summary    * STRESS ECHO.    * Stress Echo is non-diagnostic secondary to suboptimal maximum predicted  heart rate and image quality.    * Stress EKG is negative for ischemic changes.    * No chest pain noted.    * 6 MET and 82 % max predicted heart rate (MPHR) achieved.    * Average aerobic capacity.    * RESTING ECHO.    * The left ventricular systolic function is normal, estimated LVEF 60-65%.    * No prior study available for comparison.    Patient Info    Name:     Nael Silveira  Age:     72 years  :     1950  Gender:     Male  MRN:     [id number]  Accession #:     [id number]  Ht:     70 in  Wt:     230 lbs  BSA:      2.30 m2  HR:     60 bpm  Systolic BP:     130 mmHg  Diastolic BP:     70 mmHg  Heart Rhythm:     Normal sinus rhythm  Technical Quality:     Diagnostic quality  Exam Date/Time:     1/27/2023 10:27 AM  Primary Loc:     [facility]  Patient Status:     Referral  Exam Type:     STRESS ECHO W/ CONTRAST    Study Info  Indications      OH (dyspnea on exertion) -  Procedure    Exercise stress echocardiogram is performed with contrast.  Comments    Contrast agent was administered to enhance endocardial definition.    Procedure Details  Contrast/Agitated Saline    ------------------------------  Agent:     Definity  Echo Contrast Reaction:     None    Staff  Ordering Physician:     Gagan Spear  Sonographer:     Addis PHELPS RDCS, RVT    Reading Physician:     Freddie Bansal MD    Primary Care Physician:     Gagan Spear    Stress Personnel:     KRISTI Pace      Stress Echo Findings    Stress Echo is non-diagnostic secondary to suboptimal maximum predicted  heart rate and image quality. Stress EKG is negative for ischemic changes. No  chest pain noted. 6 MET and 82 % max predicted heart rate (MPHR) achieved.  Average aerobic capacity. Normal blood pressure response to exercise.      Rest Echo Findings  Left Ventricle    The left ventricle is normal size. There is moderate concentric left  ventricular hypertrophy. The left ventricular systolic function is normal,  estimated LVEF 60-65%. Left ventricular wall motion is grossly normal however,  endocardial definition is limited.  Right Ventricle    The right ventricle is not well visualized. Probably normal right  ventricular systolic function.      Left Atrium    The left atrium is borderline enlarged.    Right Atrium    The right atrium is normal.      Aortic Valve    The aortic valve is trileaflet. There is mild aortic valve sclerosis. There  is no aortic valve stenosis. There is no significant aortic regurgitation.    Mitral Valve    The mitral valve leaflets are  mildly thickened. There is mild mitral  regurgitation.    Tricuspid Valve    The tricuspid valve leaflets are mildly thickened. There is no significant  tricuspid regurgitation. There is no significant tricuspid valve stenosis.      Pericardium/Pleura    There is no pericardial effusion.    Vessels    The aortic measurements are indexed to age and body surface area. The aortic  root is normal in size measuring 3.5 cm. The proximal ascending aorta is  borderline dilated measuring 4.2 cm.      Protocol:     Robbin  Resting HR:     71 bpm  Peak HR:     122 bpm  Rest Sys BP:     138 mmHg  Peak Sys BP:     146 mmHg  Max Pred HR:     149 bpm  % Max Pred HR:     82 %  Target HR:     127 bpm  Max RPP:     17,812 bpm*mmHg  Adequacy of Test:     Target heart rate not achieved  Termination Reason:     Lightheadedness, Fatigue  Stress Symptoms:     Lightheadedness, Leg fatigue, Fatigue  Total Time:     3 min : 48 sec  Rest Avalos BP:     70 mmHg  Peak Avalos BP:     64 mmHg  Angina Score:     None  Total METS:     6    ------------------------------  Stage:     Rest  Duration (min):     0 min : 0 sec  Speed (mph):     0.0  Grade (%):     0  HR (bpm):     71  SBP (mmHg):     138  DBP (mmHg):     70  SPO2 (%):     97  METS:     1.0  Symptoms:     None    ------------------------------  Stage:     Exe 1  Duration (min):     3 min : 0 sec  Speed (mph):     1.7  Grade (%):     10  HR (bpm):     111  SBP (mmHg):     146  DBP (mmHg):     64  SPO2 (%):     95  METS:     4.6  Symptoms:     Leg fatigue    ------------------------------  Stage:     Exe 2  Duration (min):     3 min : 48 sec  Speed (mph):     2.5  Grade (%):     12  HR (bpm):     122  SBP (mmHg):     ---  DBP (mmHg):     ---  METS:     5.6  Symptoms:     Leg fatigue,  Fatigue and Lightheadedness    ------------------------------  Stage:     Rec 1  Duration (min):     1 min : 0 sec  Speed (mph):     0.0  Grade (%):     0  HR (bpm):     117  SBP (mmHg):     ---  DBP (mmHg):      ---  METS:     1.0  Symptoms:     None    ------------------------------  Stage:     Rec 2  Duration (min):     3 min : 0 sec  Speed (mph):     0.0  Grade (%):     0  HR (bpm):     102  SBP (mmHg):     140  DBP (mmHg):     64  METS:     1.0  Symptoms:     None    ------------------------------  Stage:     Rec 3  Duration (min):     5 min : 0 sec  Speed (mph):     0.0  Grade (%):     0  HR (bpm):     92  SBP (mmHg):     156  DBP (mmHg):     70  METS:     1.0  Symptoms:     None    ------------------------------  Stage:     Rec 5  Duration (min):     9 min : 0 sec  Speed (mph):     0.0  Grade (%):     0  HR (bpm):     90  SBP (mmHg):     142  DBP (mmHg):     62  METS:     1.0  Symptoms:     None    Effort Tolerance:     Moderately decreased exercise capacity      History/Risk Factors  History/Risk Factors  Hypertension:     Yes  Dyslipidemia:     Yes  Diabetes Mellitus:     Yes  Sedentary:     Yes  Tobacco Use:     Former      Heart/Lung Sounds    No murmur, CTA.      Prior Interventions  Pacemaker:     No      Medications Held    ------------------------------  Name:     Amlodipine  Dosage:     10 mg  Frequency:     daily  Held:     Yes    ------------------------------  Name:     Isosorbide  Dosage:     60 mg  Frequency:     daily  Held:     Yes      Left Ventricular Outflow Tract  ----------------------------------------------------------------------  Name                                 Value        Normal  ----------------------------------------------------------------------    LVOT Doppler  ----------------------------------------------------------------------  LVOT Peak Velocity                 1 m/sec                LVOT Peak Gradient                  4 mmHg                LVOT Mean Gradient                  2 mmHg                LVOT VTI                           19.4 cm                LVOT VTI/AV VTI Ratio                   0.6    Aorta  ----------------------------------------------------------------------  Name                                 Value        Normal  ----------------------------------------------------------------------    Ascending Aorta  ----------------------------------------------------------------------  Sinus of Valsalva Diameter          3.5 cm       3.1-3.7  Prox Asc Ao Diameter                4.2 cm       2.6-3.4    Aortic Valve  ----------------------------------------------------------------------  Name                                 Value        Normal  ----------------------------------------------------------------------    AV Doppler  ----------------------------------------------------------------------  AV Peak Velocity                   1.8 m/s                AV Peak Gradient                   13 mmHg                AV Mean Gradient                    6 mmHg           <10  AV VTI                             34.6 cm                AV V1/V2 Ratio                        0.58    Ventricles  ----------------------------------------------------------------------  Name                                 Value        Normal  ----------------------------------------------------------------------    LV Dimensions 2D/MM  ----------------------------------------------------------------------   IVS Diastolic Thickness  (2D)                                1.4 cm          <1.0  LVID Diastole (2D)                  5.0 cm         <=5.5   LVPW Diastolic Thickness  (2D)                                1.3 cm       0.6-1.0  LVID Systole (2D)                   4.0 cm       2.5-4.0    LV Fractional Shortening/Ejection Fraction 2D/MM  ----------------------------------------------------------------------   LV Fractional Shortening  (2D)                                  20 %         25-43  LV EF (2D Teicholz)                   41 %         52-72    RV Dimensions  2D/MM  ----------------------------------------------------------------------   RV Basal Diastolic  Dimension                           3.8 cm       2.5-4.1   RV Mid-Cavity Diastolic  Dimension                           2.9 cm       1.9-3.5    Atria  ----------------------------------------------------------------------  Name                                 Value        Normal  ----------------------------------------------------------------------    LA Dimensions  ----------------------------------------------------------------------  LA Dimension (2D)                   4.2 cm       3.0-4.1  LA Volume (4C A-L)                   46 ml                LA Volume (BP A-L)                   46 ml                  RA Dimensions  ----------------------------------------------------------------------  RA Area (4C)                      12.2 cm2        <=18.0      Report Signatures  Stress ECG Finalized by Freddie Bansal MD on 2023 12:25 PM  Echo Finalized by Freddie Bansal MD on 2023 12:25 PM    Procedure Note    Freddie Bansal MD - 2023  Formatting of this note might be different from the original.  Interpretation Summary    * STRESS ECHO.    * Stress Echo is non-diagnostic secondary to suboptimal maximum predicted  heart rate and image quality.    * Stress EKG is negative for ischemic changes.    * No chest pain noted.    * 6 MET and 82 % max predicted heart rate (MPHR) achieved.    * Average aerobic capacity.    * RESTING ECHO.    * The left ventricular systolic function is normal, estimated LVEF 60-65%.    * No prior study available for comparison.      Patient Info  Name:     Nael Silveira  Age:     72 years  :     1950  Gender:     Male  MRN:     [id number]  Accession #:     [id number]  Ht:     70 in  Wt:     230 lbs  BSA:     2.30 m2  HR:     60 bpm  Systolic BP:     130 mmHg  Diastolic BP:     70 mmHg  Heart Rhythm:     Normal sinus rhythm  Technical Quality:     Diagnostic quality  Exam Date/Time:      1/27/2023 10:27 AM  Primary Loc:     [facility]  Patient Status:     Referral  Exam Type:     STRESS ECHO W/ CONTRAST    Study Info  Indications      OH (dyspnea on exertion) -  Procedure    Exercise stress echocardiogram is performed with contrast.  Comments    Contrast agent was administered to enhance endocardial definition.    Procedure Details  Contrast/Agitated Saline    ------------------------------  Agent:     Definity  Echo Contrast Reaction:     None    Staff  Ordering Physician:     Gagan Spear  Sonographer:     Addis PHELPS RDCS, RVT    Reading Physician:     Freddie Bansal MD    Primary Care Physician:     Gagan Spear    Stress Personnel:     KRISTI Pace      Stress Echo Findings    Stress Echo is non-diagnostic secondary to suboptimal maximum predicted  heart rate and image quality. Stress EKG is negative for ischemic changes. No  chest pain noted. 6 MET and 82 % max predicted heart rate (MPHR) achieved.  Average aerobic capacity. Normal blood pressure response to exercise.      Rest Echo Findings  Left Ventricle    The left ventricle is normal size. There is moderate concentric left  ventricular hypertrophy. The left ventricular systolic function is normal,  estimated LVEF 60-65%. Left ventricular wall motion is grossly normal however,  endocardial definition is limited.  Right Ventricle    The right ventricle is not well visualized. Probably normal right  ventricular systolic function.      Left Atrium    The left atrium is borderline enlarged.    Right Atrium    The right atrium is normal.      Aortic Valve    The aortic valve is trileaflet. There is mild aortic valve sclerosis. There  is no aortic valve stenosis. There is no significant aortic regurgitation.    Mitral Valve    The mitral valve leaflets are mildly thickened. There is mild mitral  regurgitation.    Tricuspid Valve    The tricuspid valve leaflets are mildly thickened. There is no significant  tricuspid regurgitation. There  is no significant tricuspid valve stenosis.      Pericardium/Pleura    There is no pericardial effusion.    Vessels    The aortic measurements are indexed to age and body surface area. The aortic  root is normal in size measuring 3.5 cm. The proximal ascending aorta is  borderline dilated measuring 4.2 cm.      Protocol:     Robbin  Resting HR:     71 bpm  Peak HR:     122 bpm  Rest Sys BP:     138 mmHg  Peak Sys BP:     146 mmHg  Max Pred HR:     149 bpm  % Max Pred HR:     82 %  Target HR:     127 bpm  Max RPP:     17,812 bpm*mmHg  Adequacy of Test:     Target heart rate not achieved  Termination Reason:     Lightheadedness, Fatigue  Stress Symptoms:     Lightheadedness, Leg fatigue, Fatigue  Total Time:     3 min : 48 sec  Rest Avalos BP:     70 mmHg  Peak Avalos BP:     64 mmHg  Angina Score:     None  Total METS:     6    ------------------------------  Stage:     Rest  Duration (min):     0 min : 0 sec  Speed (mph):     0.0  Grade (%):     0  HR (bpm):     71  SBP (mmHg):     138  DBP (mmHg):     70  SPO2 (%):     97  METS:     1.0  Symptoms:     None    ------------------------------  Stage:     Exe 1  Duration (min):     3 min : 0 sec  Speed (mph):     1.7  Grade (%):     10  HR (bpm):     111  SBP (mmHg):     146  DBP (mmHg):     64  SPO2 (%):     95  METS:     4.6  Symptoms:     Leg fatigue    ------------------------------  Stage:     Exe 2  Duration (min):     3 min : 48 sec  Speed (mph):     2.5  Grade (%):     12  HR (bpm):     122  SBP (mmHg):     ---  DBP (mmHg):     ---  METS:     5.6  Symptoms:     Leg fatigue,  Fatigue and Lightheadedness    ------------------------------  Stage:     Rec 1  Duration (min):     1 min : 0 sec  Speed (mph):     0.0  Grade (%):     0  HR (bpm):     117  SBP (mmHg):     ---  DBP (mmHg):     ---  METS:     1.0  Symptoms:     None    ------------------------------  Stage:     Rec 2  Duration (min):     3 min : 0 sec  Speed (mph):     0.0  Grade (%):     0  HR (bpm):      102  SBP (mmHg):     140  DBP (mmHg):     64  METS:     1.0  Symptoms:     None    ------------------------------  Stage:     Rec 3  Duration (min):     5 min : 0 sec  Speed (mph):     0.0  Grade (%):     0  HR (bpm):     92  SBP (mmHg):     156  DBP (mmHg):     70  METS:     1.0  Symptoms:     None    ------------------------------  Stage:     Rec 5  Duration (min):     9 min : 0 sec  Speed (mph):     0.0  Grade (%):     0  HR (bpm):     90  SBP (mmHg):     142  DBP (mmHg):     62  METS:     1.0  Symptoms:     None    Effort Tolerance:     Moderately decreased exercise capacity      History/Risk Factors  History/Risk Factors  Hypertension:     Yes  Dyslipidemia:     Yes  Diabetes Mellitus:     Yes  Sedentary:     Yes  Tobacco Use:     Former      Heart/Lung Sounds    No murmur, CTA.      Prior Interventions  Pacemaker:     No      Medications Held    ------------------------------  Name:     Amlodipine  Dosage:     10 mg  Frequency:     daily  Held:     Yes    ------------------------------  Name:     Isosorbide  Dosage:     60 mg  Frequency:     daily  Held:     Yes      Left Ventricular Outflow Tract  ----------------------------------------------------------------------  Name                                 Value        Normal  ----------------------------------------------------------------------    LVOT Doppler  ----------------------------------------------------------------------  LVOT Peak Velocity                 1 m/sec                LVOT Peak Gradient                  4 mmHg                LVOT Mean Gradient                  2 mmHg                LVOT VTI                           19.4 cm                LVOT VTI/AV VTI Ratio                  0.6    Aorta  ----------------------------------------------------------------------  Name                                 Value        Normal  ----------------------------------------------------------------------    Ascending  Aorta  ----------------------------------------------------------------------  Sinus of Valsalva Diameter          3.5 cm       3.1-3.7  Prox Asc Ao Diameter                4.2 cm       2.6-3.4    Aortic Valve  ----------------------------------------------------------------------  Name                                 Value        Normal  ----------------------------------------------------------------------    AV Doppler  ----------------------------------------------------------------------  AV Peak Velocity                   1.8 m/s                AV Peak Gradient                   13 mmHg                AV Mean Gradient                    6 mmHg           <10  AV VTI                             34.6 cm                AV V1/V2 Ratio                        0.58    Ventricles  ----------------------------------------------------------------------  Name                                 Value        Normal  ----------------------------------------------------------------------    LV Dimensions 2D/MM  ----------------------------------------------------------------------   IVS Diastolic Thickness  (2D)                                1.4 cm          <1.0  LVID Diastole (2D)                  5.0 cm         <=5.5   LVPW Diastolic Thickness  (2D)                                1.3 cm       0.6-1.0  LVID Systole (2D)                   4.0 cm       2.5-4.0    LV Fractional Shortening/Ejection Fraction 2D/MM  ----------------------------------------------------------------------   LV Fractional Shortening  (2D)                                  20 %         25-43  LV EF (2D Teicholz)                   41 %         52-72    RV Dimensions 2D/MM  ----------------------------------------------------------------------   RV Basal Diastolic  Dimension                           3.8 cm       2.5-4.1   RV Mid-Cavity Diastolic  Dimension                           2.9 cm        1.9-3.5    Atria  ----------------------------------------------------------------------  Name                                 Value        Normal  ----------------------------------------------------------------------    LA Dimensions  ----------------------------------------------------------------------  LA Dimension (2D)                   4.2 cm       3.0-4.1  LA Volume (4C A-L)                   46 ml                LA Volume (BP A-L)                   46 ml                  RA Dimensions  ----------------------------------------------------------------------  RA Area (4C)                      12.2 cm2        <=18.0      Report Signatures  Stress ECG Finalized by Freddie Bansal MD on 2023 12:25 PM  Echo Finalized by Freddie Bansal MD on 2023 12:25 PM      Nuclear stress test 2023:    Summary    1. Pharmacologic nuclear stress test is negative for inducible ischemia.    2. Stress EKG is negative for ischemia.    3. Post stress left ventricular ejection fraction is normal, estimated LVEF,  62 %.    4. No prior study .    Patient Info  Name:     Nale Silveira  Age:     72 years  :     1950  Gender:     Male  MRN:     [id number]  Accession #:     [id number]  Ht:     69 in  Wt:     235 lb  BMI:     34.70  HR:     67 bpm  BP:     142  /     78 mmHg  Technical Quality:     Diagnostic quality  Exam Date:     3/27/2023 9:30 AM  Patient Status:     Outpatient  Admit Date:     3/27/2023  Primary Loc:     [facility]    Any Known Allergies:     NKDA    Study Info  Indications      Pre-operative clearance -      Shortness of breath -      Evaluation of myocardial perfusion -    Exam Type:     NM STRESS TEST    Upright Gated Rest SPECT images and Upright Gated Stress SPECT images obtained  post sestamibi injection. Supine Stress SPECT images obtained.    Staff  Ordering Physician:     Gagan Spear  Primary Care Physician:     Gagan Spear  Nuclear Technologist:     Velvet HURLEY CNMT, RT, CT, ARRT  (N)  Stress Personnel:     Leandro BERRY      History/Risk Factors  History/Risk Factors  Hypertension:     Yes  Dyslipidemia:     Yes  Diabetes Mellitus:     Yes  Family History of CAD:     No  Sedentary:     Yes  Tobacco Use:     Former      Heart/Lung Sounds    No murmur, CTA.      Prior Interventions  Pacemaker:     No      Medications Held    ------------------------------  Name:     Amlodipine  Dosage:     10 mg  Frequency:     daily  Held:     Yes    ------------------------------  Name:     Imdur  Dosage:     60 mg  Frequency:     daily  Held:     Yes      Stress ECG Details  Protocol:     Regadenoson  Rest HR:     67 bpm  Rest Sys BP:     142 mmHg  Max Pred HR:     149 bpm  Target HR:     127 bpm  Adequacy of Test:     Typical symptoms with regadenoson infusion. Probable  adequate vasodilation response.  Termination Reason:     Regadenoson protocol completed  Total Time:     1 min : 0 sec  Rest Avalos BP:     78 mmHg  Total Dose:     0.4 mg    ------------------------------  Stage:     Rest  Duration (min):     0 min : 0 sec  HR (bpm):     67  SBP (mmHg):     142  DBP (mmHg):     78  SPO2:     98  Symptoms:     None    ------------------------------  Stage:     Exe 1  Duration (min):     1 min : 0 sec  HR (bpm):     81  SBP (mmHg):     146  DBP (mmHg):     72  SPO2:     98  Symptoms:     Dyspnea: Mild    ------------------------------  Stage:     Rec 1  Duration (min):     1 min : 1 sec  HR (bpm):     84  SBP (mmHg):     138  DBP (mmHg):     72  SPO2:     98  Symptoms:     Dyspnea: Mild    ------------------------------  Stage:     Rec 2  Duration (min):     3 min : 1 sec  HR (bpm):     77  SBP (mmHg):     142  DBP (mmHg):     70  SPO2:     98  Symptoms:     None    ------------------------------  Stage:     Rec 3  Duration (min):     5 min : 1 sec  HR (bpm):     75  SBP (mmHg):     136  DBP (mmHg):     72  SPO2:     98  Symptoms:     None  Comments:     PAC    Post Vasodilator Lowest HR:     75 bpm    Lowest  Sys BP:     138 mmHg    Lowest Avalos BP:     70 mmHg    Stress Symptoms    Shortness of breath.    Effort Tolerance:     Not applicable.    Route of Administration:     Intravenous    Duration of Administration:     10 seconds      Radiopharmaceutical:     Tc-99m Sestamibi    Route of Administration:     Intravenous    Radiopharmaceutical:     Tc-99m Sestamibi    Route of Administration:     Intravenous    Image Protocol  Protocol:     Rest/Stress 1 Day  Rest  Dose:     10.1 mCi  Stress  Dose:     30.4 mCi      SPECT Results  Perfusion Findings  Post stress left ventricular ejection fraction is normal, estimated LVEF, 62  %. Transient Ischemic Dilatation of 1.07 (normal). Lung to heart ratio (LHR)  uptake is 0.30 (normal).  Summed Difference Score:     0  Summed Stress Score:     0  Summed Rest Score:     2    Motion Correction:     No motion correction artifact. Motion correction not  applied.      Functional Results  ----------------------------------------------------------------------  Name                                 Value        Normal  ----------------------------------------------------------------------    Stress  ----------------------------------------------------------------------  Stress LV Ejection Fraction           62 %         55-70   Stress LV End Systolic  Volume                               43 ml                 Nuclear Stress Cardiac  Output                           4.2 l/min                 Stress LV End Diastolic  Volume                              113 ml                 Transient Ischemic  Dilatation                            1.07                 Nuclear Stress Myocardial  Mass                                 134 g  Functional Results  ----------------------------------------------------------------------  Name                                 Value         Normal  ----------------------------------------------------------------------    Rest  ----------------------------------------------------------------------   Resting LV Ejection  Fraction                              58 %         55-70   Resting LV End Systolic  Volume                               43 ml                 Nuclear Rest Myocardial  Mass                                 132 g                 Resting LV End Diastolic  Volume                              103 ml                Nuclear Rest Cardiac Output      3.6 l/min  Functional Findings    Stress ejection fraction is 62 % and Rest ejection fraction is 58 % -  normal.      LHR:     0.30      Report Signatures  MPI SPECT Finalized by Jose E Starks MD on 2023 11:55 AM  Stress Finalized by Jose E Starks MD on 2023 11:55 AM    Procedure Note    Jose E Starks MD - 2023  Formatting of this note might be different from the original.  Summary    1. Pharmacologic nuclear stress test is negative for inducible ischemia.    2. Stress EKG is negative for ischemia.    3. Post stress left ventricular ejection fraction is normal, estimated LVEF,  62 %.    4. No prior study .      Patient Info  Name:     Nael Silveira  Age:     72 years  :     1950  Gender:     Male  MRN:     [id number]  Accession #:     [id number]  Ht:     69 in  Wt:     235 lb  BMI:     34.70  HR:     67 bpm  BP:     142  /     78 mmHg  Technical Quality:     Diagnostic quality  Exam Date:     3/27/2023 9:30 AM  Patient Status:     Outpatient  Admit Date:     3/27/2023  Primary Loc:     [facility]    Any Known Allergies:     NKDA    Study Info  Indications      Pre-operative clearance -      Shortness of breath -      Evaluation of myocardial perfusion -    Exam Type:     NM STRESS TEST    Upright Gated Rest SPECT images and Upright Gated Stress SPECT images obtained  post sestamibi injection. Supine Stress SPECT images obtained.    Staff  Ordering Physician:     Gagan PASCUAL  Rainer  Primary Care Physician:     Gagan Spear  Nuclear Technologist:     Velvet HURLEY CNMT, RT, CT, ARRT (N)  Stress Personnel:     Leandro BERRY      History/Risk Factors  History/Risk Factors  Hypertension:     Yes  Dyslipidemia:     Yes  Diabetes Mellitus:     Yes  Family History of CAD:     No  Sedentary:     Yes  Tobacco Use:     Former      Heart/Lung Sounds    No murmur, CTA.      Prior Interventions  Pacemaker:     No      Medications Held    ------------------------------  Name:     Amlodipine  Dosage:     10 mg  Frequency:     daily  Held:     Yes    ------------------------------  Name:     Imdur  Dosage:     60 mg  Frequency:     daily  Held:     Yes      Stress ECG Details  Protocol:     Regadenoson  Rest HR:     67 bpm  Rest Sys BP:     142 mmHg  Max Pred HR:     149 bpm  Target HR:     127 bpm  Adequacy of Test:     Typical symptoms with regadenoson infusion. Probable  adequate vasodilation response.  Termination Reason:     Regadenoson protocol completed  Total Time:     1 min : 0 sec  Rest Avalos BP:     78 mmHg  Total Dose:     0.4 mg    ------------------------------  Stage:     Rest  Duration (min):     0 min : 0 sec  HR (bpm):     67  SBP (mmHg):     142  DBP (mmHg):     78  SPO2:     98  Symptoms:     None    ------------------------------  Stage:     Exe 1  Duration (min):     1 min : 0 sec  HR (bpm):     81  SBP (mmHg):     146  DBP (mmHg):     72  SPO2:     98  Symptoms:     Dyspnea: Mild    ------------------------------  Stage:     Rec 1  Duration (min):     1 min : 1 sec  HR (bpm):     84  SBP (mmHg):     138  DBP (mmHg):     72  SPO2:     98  Symptoms:     Dyspnea: Mild    ------------------------------  Stage:     Rec 2  Duration (min):     3 min : 1 sec  HR (bpm):     77  SBP (mmHg):     142  DBP (mmHg):     70  SPO2:     98  Symptoms:     None    ------------------------------  Stage:     Rec 3  Duration (min):     5 min : 1 sec  HR (bpm):     75  SBP (mmHg):     136  DBP  (mmHg):     72  SPO2:     98  Symptoms:     None  Comments:     PAC    Post Vasodilator Lowest HR:     75 bpm    Lowest Sys BP:     138 mmHg    Lowest Avalos BP:     70 mmHg    Stress Symptoms    Shortness of breath.    Effort Tolerance:     Not applicable.    Route of Administration:     Intravenous    Duration of Administration:     10 seconds      Radiopharmaceutical:     Tc-99m Sestamibi    Route of Administration:     Intravenous    Radiopharmaceutical:     Tc-99m Sestamibi    Route of Administration:     Intravenous    Image Protocol  Protocol:     Rest/Stress 1 Day  Rest  Dose:     10.1 mCi  Stress  Dose:     30.4 mCi      SPECT Results  Perfusion Findings  Post stress left ventricular ejection fraction is normal, estimated LVEF, 62  %. Transient Ischemic Dilatation of 1.07 (normal). Lung to heart ratio (LHR)  uptake is 0.30 (normal).  Summed Difference Score:     0  Summed Stress Score:     0  Summed Rest Score:     2    Motion Correction:     No motion correction artifact. Motion correction not  applied.      Functional Results  ----------------------------------------------------------------------  Name                                 Value        Normal  ----------------------------------------------------------------------    Stress  ----------------------------------------------------------------------  Stress LV Ejection Fraction           62 %         55-70   Stress LV End Systolic  Volume                               43 ml                 Nuclear Stress Cardiac  Output                           4.2 l/min                 Stress LV End Diastolic  Volume                              113 ml                 Transient Ischemic  Dilatation                            1.07                 Nuclear Stress Myocardial  Mass                                 134 g  Functional Results  ----------------------------------------------------------------------  Name                                 Value         Normal  ----------------------------------------------------------------------    Rest  ----------------------------------------------------------------------   Resting LV Ejection  Fraction                              58 %         55-70   Resting LV End Systolic  Volume                               43 ml                 Nuclear Rest Myocardial  Mass                                 132 g                 Resting LV End Diastolic  Volume                              103 ml                Nuclear Rest Cardiac Output      3.6 l/min  Functional Findings    Stress ejection fraction is 62 % and Rest ejection fraction is 58 % -  normal.    LHR:     0.30    Report Signatures  MPI SPECT Finalized by Jose E Starks MD on 03/27/2023 11:55 AM  Stress Finalized by Jose E Starks MD on 03/27/2023 11:55 AM    Assessment and Plan:    Nael Silveira is a 73-year-old man who was seen for a cardiology evaluation prior to a renal transplant surgery. He has no cardiac history and is asymptomatic from a cardiac perspective. His cardiac risk factors place him at an intermediate risk for CAD, for which he was evaluated with stress testing that showed no evidence of ischemia or prior MI. Therefore, he is deemed to be at an acceptable risk to proceed to renal transplant surgery. He does not need any further cardiac testing.    Aleksandr Pearson MD  Cardiology      Please do not hesitate to contact me if you have any questions/concerns.     Sincerely,     Aleksandr Pearson MD

## 2023-10-30 ENCOUNTER — TELEPHONE (OUTPATIENT)
Dept: TRANSPLANT | Facility: CLINIC | Age: 73
End: 2023-10-30

## 2023-10-30 NOTE — TELEPHONE ENCOUNTER
Patient Call: General  Route to LPN    Reason for call: Nael would like to update Coordinator, that all his testing is completed and would like to know the next steps.    Call back needed? Yes    Return Call Needed  Same as documented in contacts section  When to return call?: Same day: Route High Priority

## 2023-11-16 ENCOUNTER — TELEPHONE (OUTPATIENT)
Dept: TRANSPLANT | Facility: CLINIC | Age: 73
End: 2023-11-16
Payer: MEDICARE

## 2023-11-16 NOTE — TELEPHONE ENCOUNTER
Patient Call: General  Route to LPN    Reason for call: Nael called on 10/30/23 and hasn't gotten a call back yet, patient stated he's just following up, and to let Coordinator know he has completed all testing that was required.    Call back needed? Yes    Return Call Needed  Same as documented in contacts section  When to return call?: Same day: Route High Priority

## 2023-11-16 NOTE — TELEPHONE ENCOUNTER
Returned call today to pt who reported he has completed cardiologist appt, aorto iliac artery US, abd pelvic CT and has lost 10 pounds. Explained I will review all at Selection Committee next week and will call him again. Explained he does not have any live donors in eval at this time, encouraged pt to keep looking. Pt expressed excellent understanding of all and was in good agreement with the plan.

## 2023-12-01 ENCOUNTER — TELEPHONE (OUTPATIENT)
Dept: TRANSPLANT | Facility: CLINIC | Age: 73
End: 2023-12-01
Payer: MEDICARE

## 2023-12-01 NOTE — TELEPHONE ENCOUNTER
Contacted patient to review outcome of selection committee meeting (See selection committee encounter).   Explained to patient that he needs to complete all components of the evaluation to be eligible for active status on the waiting list or to proceed with a live donor kidney transplant.   Reviewed next steps based on outcomes: Will proceed with coronary angiogram when pt either has an approved live donor or else has been started on dialysis. Pt will remain INACTIVE status for now. Pt to have live donors register.   Patient will be listed as inactive (is not on dialysis and evaluation is not complete)-will receive:   -An Evaluation Summary Letter indicating what is needed to complete evaluation-discussed with patient if they would like to have testing done with [facility] or locally  Confirmed with patient that on successful completion of outstanding components, patient is eligible for active status and they will receive a follow-up call.   Confirmed that patient has contact information for additional questions or concerns.    Generated Transplant Evaluation Summary Letter today in EPIC - electronically routed to pt/ providers.

## 2023-12-01 NOTE — LETTER
12/01/23        Nael Silveira  [address] [id number]        Dear Nael,    Your pre-transplant evaluation results were reviewed at our Multidisciplinary Selection Committee on 11/22/2023. The Committee is requesting the following items are completed before determining your candidacy:    Coronary angiogram to assess your heart arteries for any narrowed areas or blockages. Because this test uses dye, it will be postponed until you either have an approved live donor or else have been started on dialysis.     I will notify you in the event of an approved live donor.  Please do let me know if you are started on dialysis.  In either case, your angiogram will be scheduled at that time.     Upon successful completion of the above item, your results will be reviewed at the Multidisciplinary Selection Committee for determination of the next steps and for approval. Upon approval, you will be eligible to proceed with being changed to ACTIVE status on the waitlist as well as to proceed with a live donor kidney transplant.     Please have any potential live kidney donors register online at [facility] to initiate their evaluations through our program's living donor screening tool at [email or web address]. Please note that potential donors will get an e-mail response once they submit their form within 1-2 business days. Potential donors may call our Main Office Number at [phone] and ask to speak with a donor coordinator if they have questions about the process. You will be notified by your transplant coordinator if a live donor has been approved for donation.      For any questions, please contact myself at the Transplant Office Main Number at [phone] or at my Direct Number at [phone]                  Sincerely,  Kaitlin Rust, RN, BSN  Pre Kidney Pancreas Transplant Coordinator   [facility]  Solid Organ Transplant [facility]   [address] [place] [address] [address]  [facility]  [address] [place] [id number]  [email or web address]   Office Number: [phone] Direct Number: [phone]   Fax Number: [phone]  Employed by [facility]     CC's: Dr. Gagan Spear, Dr. Jose E Ansari

## 2023-12-03 ENCOUNTER — HEALTH MAINTENANCE LETTER (OUTPATIENT)
Age: 73
End: 2023-12-03

## 2024-01-05 ENCOUNTER — DOCUMENTATION ONLY (OUTPATIENT)
Dept: TRANSPLANT | Facility: CLINIC | Age: 74
End: 2024-01-05
Payer: MEDICARE

## 2024-01-05 ENCOUNTER — TELEPHONE (OUTPATIENT)
Dept: TRANSPLANT | Facility: CLINIC | Age: 74
End: 2024-01-05
Payer: MEDICARE

## 2024-01-05 NOTE — TELEPHONE ENCOUNTER
Called pt to introduce myself as WL coordinator and encouraged pt to stay up on health maintenance and to let us know if insurance changes, contact info updates. Explained WL protocol for pt's status and when follow up is needed. Gave pt direct information and encouraged to reach out with any questions/concerns. Pt reports he is down to 231lbs, reviewed pt should not gain any weight and keep writer updated with medical changes. Pt needs angiogram prior to transplant, he will notify writer when he starts HD.

## 2024-01-08 ENCOUNTER — TELEPHONE (OUTPATIENT)
Dept: TRANSPLANT | Facility: CLINIC | Age: 74
End: 2024-01-08
Payer: MEDICARE

## 2024-01-08 NOTE — TELEPHONE ENCOUNTER
Spoke with pt and his wife today. Wished them all the best with their future kidney transplant. Reviewed standard process to transfer to the waitlist team. Both pt and wife expressed excellent understanding of all and were in good agreement with the plan.

## 2024-04-21 ENCOUNTER — HEALTH MAINTENANCE LETTER (OUTPATIENT)
Age: 74
End: 2024-04-21

## 2024-09-08 ENCOUNTER — HEALTH MAINTENANCE LETTER (OUTPATIENT)
Age: 74
End: 2024-09-08

## 2024-12-12 ENCOUNTER — TELEPHONE (OUTPATIENT)
Dept: TRANSPLANT | Facility: CLINIC | Age: 74
End: 2024-12-12
Payer: MEDICARE

## 2024-12-12 NOTE — TELEPHONE ENCOUNTER
Received call from pt. He had a visit recently with his nephrologist. Pt reports Dr. Ansari stated he should be active on the waitlist. Reviewed pt was listed INACTIVE on 2023. Needs angiogram for transplant clearance, cannot move forward with this until on HD or LD approved. Pt has no LD in evaluation at this time. Discussed contrast can damage kidneys which is why angiogram is avoided until on HD. Reviewed pt is a blood type O and typical wait time for  donor is 5 to 7 years. Pt had about 1 year and 3 months of time so would not get offers quickly if angiogram was pursued. Pt verbalized understanding of information and has no further questions. Encouraged to reach out if questions arise.

## 2025-01-05 ENCOUNTER — HEALTH MAINTENANCE LETTER (OUTPATIENT)
Age: 75
End: 2025-01-05

## 2025-02-19 ENCOUNTER — APPOINTMENT (OUTPATIENT)
Dept: URBAN - METROPOLITAN AREA CLINIC 259 | Age: 75
Setting detail: DERMATOLOGY
End: 2025-02-24

## 2025-02-19 VITALS — WEIGHT: 235 LBS | HEIGHT: 69 IN

## 2025-02-19 DIAGNOSIS — L82.1 OTHER SEBORRHEIC KERATOSIS: ICD-10-CM

## 2025-02-19 DIAGNOSIS — L81.4 OTHER MELANIN HYPERPIGMENTATION: ICD-10-CM

## 2025-02-19 DIAGNOSIS — Z71.89 OTHER SPECIFIED COUNSELING: ICD-10-CM

## 2025-02-19 DIAGNOSIS — L57.8 OTHER SKIN CHANGES DUE TO CHRONIC EXPOSURE TO NONIONIZING RADIATION: ICD-10-CM

## 2025-02-19 DIAGNOSIS — D18.0 HEMANGIOMA: ICD-10-CM

## 2025-02-19 DIAGNOSIS — D22 MELANOCYTIC NEVI: ICD-10-CM

## 2025-02-19 PROBLEM — D22.5 MELANOCYTIC NEVI OF TRUNK: Status: ACTIVE | Noted: 2025-02-19

## 2025-02-19 PROBLEM — D18.01 HEMANGIOMA OF SKIN AND SUBCUTANEOUS TISSUE: Status: ACTIVE | Noted: 2025-02-19

## 2025-02-19 PROCEDURE — 99213 OFFICE O/P EST LOW 20 MIN: CPT

## 2025-02-19 PROCEDURE — OTHER COUNSELING: OTHER

## 2025-02-19 PROCEDURE — OTHER DEFER: OTHER

## 2025-02-19 PROCEDURE — OTHER MIPS QUALITY: OTHER

## 2025-02-19 ASSESSMENT — LOCATION ZONE DERM
LOCATION ZONE: TRUNK
LOCATION ZONE: SCALP

## 2025-02-19 ASSESSMENT — LOCATION SIMPLE DESCRIPTION DERM
LOCATION SIMPLE: LEFT UPPER BACK
LOCATION SIMPLE: LEFT SCALP

## 2025-02-19 ASSESSMENT — LOCATION DETAILED DESCRIPTION DERM
LOCATION DETAILED: LEFT MEDIAL FRONTAL SCALP
LOCATION DETAILED: LEFT MEDIAL UPPER BACK
LOCATION DETAILED: LEFT SUPERIOR MEDIAL UPPER BACK

## 2025-02-19 NOTE — HPI: FULL BODY SKIN EXAMINATION
What Type Of Note Output Would You Prefer (Optional)?: Standard Output
What Is The Reason For Today's Visit?: Full Body Skin Examination
What Is The Reason For Today's Visit? (Being Monitored For X): concerning skin lesions on an annual basis
Additional History: The patient reports he has scab-like lesions on the scalp. He states the lesions are asymptomatic.

## 2025-02-19 NOTE — PROCEDURE: DEFER
X Size Of Lesion In Cm (Optional): 0
Procedure To Be Performed At Next Visit: Cryotherapy
Reason To Defer Override: consider performing if lesions become symptomatic
Introduction Text (Please End With A Colon): The following procedure was deferred:
Detail Level: Zone

## 2025-04-20 ENCOUNTER — HEALTH MAINTENANCE LETTER (OUTPATIENT)
Age: 75
End: 2025-04-20

## 2025-08-03 ENCOUNTER — HEALTH MAINTENANCE LETTER (OUTPATIENT)
Age: 75
End: 2025-08-03